# Patient Record
Sex: FEMALE | Race: WHITE | Employment: FULL TIME | URBAN - METROPOLITAN AREA
[De-identification: names, ages, dates, MRNs, and addresses within clinical notes are randomized per-mention and may not be internally consistent; named-entity substitution may affect disease eponyms.]

---

## 2020-10-23 ENCOUNTER — OFFICE VISIT (OUTPATIENT)
Dept: FAMILY MEDICINE CLINIC | Age: 40
End: 2020-10-23
Payer: COMMERCIAL

## 2020-10-23 VITALS
TEMPERATURE: 98.4 F | SYSTOLIC BLOOD PRESSURE: 137 MMHG | WEIGHT: 293 LBS | OXYGEN SATURATION: 96 % | RESPIRATION RATE: 20 BRPM | BODY MASS INDEX: 48.82 KG/M2 | HEIGHT: 65 IN | HEART RATE: 77 BPM | DIASTOLIC BLOOD PRESSURE: 77 MMHG

## 2020-10-23 DIAGNOSIS — E66.01 OBESITY, MORBID (HCC): ICD-10-CM

## 2020-10-23 DIAGNOSIS — Z91.09 ENVIRONMENTAL ALLERGIES: ICD-10-CM

## 2020-10-23 DIAGNOSIS — E53.9 VITAMIN B DEFICIENCY: ICD-10-CM

## 2020-10-23 DIAGNOSIS — Z91.89 AT RISK FOR NUTRITION DEFICIENCY: ICD-10-CM

## 2020-10-23 DIAGNOSIS — B00.1 RECURRENT COLD SORES: ICD-10-CM

## 2020-10-23 DIAGNOSIS — Z23 ENCOUNTER FOR IMMUNIZATION: ICD-10-CM

## 2020-10-23 DIAGNOSIS — M54.9 CHRONIC BACK PAIN, UNSPECIFIED BACK LOCATION, UNSPECIFIED BACK PAIN LATERALITY: ICD-10-CM

## 2020-10-23 DIAGNOSIS — L71.9 ROSACEA: ICD-10-CM

## 2020-10-23 DIAGNOSIS — E55.9 VITAMIN D DEFICIENCY: ICD-10-CM

## 2020-10-23 DIAGNOSIS — Z23 NEEDS FLU SHOT: ICD-10-CM

## 2020-10-23 DIAGNOSIS — Z98.84 S/P GASTRIC BYPASS: ICD-10-CM

## 2020-10-23 DIAGNOSIS — Z76.89 ENCOUNTER TO ESTABLISH CARE: Primary | ICD-10-CM

## 2020-10-23 DIAGNOSIS — Z00.00 WELL WOMAN EXAM (NO GYNECOLOGICAL EXAM): ICD-10-CM

## 2020-10-23 DIAGNOSIS — G89.29 CHRONIC BACK PAIN, UNSPECIFIED BACK LOCATION, UNSPECIFIED BACK PAIN LATERALITY: ICD-10-CM

## 2020-10-23 DIAGNOSIS — F40.298 FEAR OF NEEDLES: ICD-10-CM

## 2020-10-23 DIAGNOSIS — Z87.09 HISTORY OF BRONCHITIS: ICD-10-CM

## 2020-10-23 DIAGNOSIS — R03.0 PREHYPERTENSION: ICD-10-CM

## 2020-10-23 PROCEDURE — 90686 IIV4 VACC NO PRSV 0.5 ML IM: CPT

## 2020-10-23 PROCEDURE — 90715 TDAP VACCINE 7 YRS/> IM: CPT

## 2020-10-23 PROCEDURE — 99202 OFFICE O/P NEW SF 15 MIN: CPT | Performed by: FAMILY MEDICINE

## 2020-10-23 PROCEDURE — 90472 IMMUNIZATION ADMIN EACH ADD: CPT

## 2020-10-23 PROCEDURE — 90471 IMMUNIZATION ADMIN: CPT

## 2020-10-23 PROCEDURE — 99386 PREV VISIT NEW AGE 40-64: CPT | Performed by: FAMILY MEDICINE

## 2020-10-23 RX ORDER — LANOLIN ALCOHOL/MO/W.PET/CERES
CREAM (GRAM) TOPICAL
COMMUNITY

## 2020-10-23 RX ORDER — CYANOCOBALAMIN 1000 UG/ML
INJECTION, SOLUTION INTRAMUSCULAR; SUBCUTANEOUS
COMMUNITY
End: 2021-01-25 | Stop reason: DRUGHIGH

## 2020-10-23 RX ORDER — DIPHENHYDRAMINE HCL 25 MG
CAPSULE ORAL
COMMUNITY
Start: 2015-10-06

## 2020-10-23 RX ORDER — MINOCYCLINE HYDROCHLORIDE 100 MG/1
CAPSULE ORAL
COMMUNITY
Start: 2020-08-20 | End: 2022-10-13 | Stop reason: SDUPTHER

## 2020-10-23 RX ORDER — ACYCLOVIR 400 MG/1
TABLET ORAL
COMMUNITY
Start: 2020-08-20 | End: 2022-07-13 | Stop reason: SDUPTHER

## 2020-10-23 RX ORDER — METRONIDAZOLE 7.5 MG/G
GEL TOPICAL
COMMUNITY
Start: 2020-08-20 | End: 2021-01-25

## 2020-10-23 RX ORDER — NYSTATIN AND TRIAMCINOLONE ACETONIDE 100000; 1 [USP'U]/G; MG/G
OINTMENT TOPICAL
COMMUNITY

## 2020-10-23 RX ORDER — LEVOCETIRIZINE DIHYDROCHLORIDE 5 MG/1
TABLET, FILM COATED ORAL
COMMUNITY
Start: 2018-09-01

## 2020-10-23 NOTE — PROGRESS NOTES
Chief Complaint   Patient presents with   [address]     has been going to [facility] as P[facility], decided to find a provider outside of [facility]     1. Have you been to the ER, urgent care clinic since your last visit? Hospitalized since your last visit? No    2. Have you seen or consulted any other health care providers outside of the [address] since your last visit? Include any pap smears or colon screening. Yes When: August 2020 Where: Dermatologist Reason for visit: breakouts     Telehealth with GYN June 2020 to discuss birth control. Health Maintenance Due   Topic Date Due    DTaP/Tdap/Td series (1 - Tdap) 03/30/2000    PAP AKA CERVICAL CYTOLOGY  03/30/2000    Lipid Screen  03/30/2019    Flu Vaccine (1) 09/01/2020     PAP-1/9/2020  Mammogram-3/2020  TDAP-Willing to discuss      Learning Assessment 10/23/2020   PRIMARY LEARNER Patient   HIGHEST LEVEL OF EDUCATION - PRIMARY LEARNER  > 4 YEARS OF COLLEGE   PRIMARY LANGUAGE ENGLISH   LEARNER PREFERENCE PRIMARY DEMONSTRATION   ANSWERED BY Patient   RELATIONSHIP SELF       3 most recent PHQ Screens 10/23/2020   Little interest or pleasure in doing things Not at all   Feeling down, depressed, irritable, or hopeless Not at all   Total Score PHQ 2 0       Abuse Screening Questionnaire 10/23/2020   Do you ever feel afraid of your partner? N   Are you in a relationship with someone who physically or mentally threatens you? N   Is it safe for you to go home?  Bere Breen

## 2020-10-23 NOTE — PROGRESS NOTES
[facility] Note      Subjective:     Chief Complaint   Patient presents with   [address]     has been going to [facility] as [facility], decided to find a provider outside of [facility] is a 38y.o. year old female who presents for evaluation of the following:    Establishment of Care:  Previous PCP: [facility]  Patient Care Team:  Kaye Cosme MD as PCP - General (Family Medicine)   Dentist- Ramandeep guevara dentristr  [address] [place]      PMH:   Obesity s/p Gastric Bypass :   Patient perception: \"not good\"  Motivation: \"I need to\"  Pre bypass weight: unknown  Diet: unrestricted   -No food log in office  Activity: None   -Taking Iron and getting B12 shots  -- was prescribed metformin by GYN but did not take it  Treatment:  Key Obesity Meds     Patient is on no anti-obesity meds. Last Weight Metrics:  Weight Loss Metrics 10/23/2020   Today's Wt 353 lb 12.8 oz   BMI 58.88 kg/m2       Vitamin B12 Deficiency/ Vitami D Deficiney:  Thought to be s/p gastric bypass  Has required blood transfusioninthe psat  States endoiscopy don eint he past was normal.   Also low iron and vitaminD  - not compliance with vitamin D supplementation    Chronic Back Pain:   Occurring \"every once and a while\"   Manged by orthopedist  Completed physical therapy.    Chiropractor helped    Does not do daily back stretches currently  Tx: tylnrol prn    Rosacea  Tx: name unkne- has not started yet  Manged by dermatology    Allergies  Chornic since >2 years ago   otc zyzal  Reports his otof allergy testing   Allergiic to aniaml dande and other environmental    History of Bronchitis   None last year    Oral Cold Sores:   Tx: acyclovir daily  Frequency  Triggers cold sores  Nevoer tested for herpes   Multiple family member with this  manged by GYN    Acute Concerns:  None    Social:   Employment- works as an educational specialist  Lives alone    Health Maintenance:   Health Maintenance   Topic Date Due    DTaP/Tdap/Td series (1 - Tdap) 03/30/2000    PAP AKA CERVICAL CYTOLOGY  03/30/2000    Lipid Screen  03/30/2019    Flu Vaccine (1) 09/01/2020    Pneumococcal 0-64 years  Aged Out     HIV or other STD testing: Declined  Domestic Violence Screen:   Abuse Screening Questionnaire 10/23/2020   Do you ever feel afraid of your partner? N   Are you in a relationship with someone who physically or mentally threatens you? N   Is it safe for you to go home? Y     Depression Screen:   3 most recent PHQ Screens 10/23/2020   Little interest or pleasure in doing things Not at all   Feeling down, depressed, irritable, or hopeless Not at all   Total Score PHQ 2 0     Smoker? Never     G0  Pap:  Last with GYN  Mammogram: Last with GYN  Patient's last menstrual period was 10/23/2020 (exact date). Menses Monthly, lasting 5 days. No recent worsening bleeding or intermenstrual bleeding   has no history on file for sexual activity. Review of Systems   Pertinent positives and negative per HPI. All other systems  reviewed are negative for a Comprehensive ROS (10+). History reviewed. No pertinent past medical history.      Social History     Socioeconomic History    Marital status: SINGLE     Spouse name: Not on file    Number of children: Not on file    Years of education: Not on file    Highest education level: Not on file   Occupational History    Not on file   Social Needs    Financial resource strain: Not on file    Food insecurity     Worry: Not on file     Inability: Not on file    Transportation needs     Medical: Not on file     Non-medical: Not on file   Tobacco Use    Smoking status: Not on file   Substance and Sexual Activity    Alcohol use: Not on file    Drug use: Not on file    Sexual activity: Not on file   Lifestyle    Physical activity     Days per week: Not on file     Minutes per session: Not on file    Stress: Not on file   Relationships    Social connections     Talks on phone: Not on file     Gets together: Not on file     Attends Gnosticism service: Not on file     Active member of club or organization: Not on file     Attends meetings of clubs or organizations: Not on file     Relationship status: Not on file    Intimate partner violence     Fear of current or ex partner: Not on file     Emotionally abused: Not on file     Physically abused: Not on file     Forced sexual activity: Not on file   Other Topics Concern    Not on file   Social History Narrative    Not on file       History reviewed. No pertinent family history. Current Outpatient Medications   Medication Sig    cyanocobalamin (VITAMIN B12) 1,000 mcg/mL injection B-12   B-12 injections monthly    ferrous sulfate (Iron) 325 mg (65 mg iron) tablet iron    acyclovir (ZOVIRAX) 400 mg tablet TAKE 1 TABLET BY MOUTH EVERY DAY    levocetirizine (Xyzal) 5 mg tablet     diphenhydrAMINE (BenadryL) 25 mg capsule     nystatin-triamcinolone (MYCOLOG) 100,000-0.1 unit/gram-% ointment nystatin-triamcinolone 100,000 unit/gram-0.1 % topical ointment    minocycline (MINOCIN, DYNACIN) 100 mg capsule TAKE ONE CAPSULE BY MOUTH DAILY WITH FOOD    metroNIDAZOLE (METROGEL) 0.75 % topical gel APPLY GEL TO FACE DAILY     No current facility-administered medications for this visit. Objective:     Vitals:    10/23/20 1445   BP: 137/77   Pulse: 77   Resp: 20   Temp: 98.4 °F (36.9 °C)   TempSrc: Temporal   SpO2: 96%   Weight: (!) 353 lb 12.8 oz (160.5 kg)   Height: 5' 5\" (1.651 m)       Physical Examination:  General: Alert, cooperative, no distress, appears stated age. Obese  Eyes: Conjunctivae clear. Pupils equally round and reactive to light, Extraocular muscles intact. Ears: Normal external ear canals both ears. Tympanic membranes clear and mobile bilaterally   Nose: Nares normal. Septum midline.  Mucosa normal. No drainage or sinus tenderness. Mouth/Throat: Lips, mucosa, and tongue normal. No oropharyngeal erythema. No tonsillar enlargement or exudate. Neck: Supple, symmetrical, trachea midline, no adenopathy. No thyroid enlargement/tenderness/nodules  Respiratory: Breathing comfortably, in no acute respiratory distress. Clear to auscultation bilaterally. Normal inspiratory and expiratory ratio. Cardiovascular: Regular rate and rhythm, S1, S2 normal, no murmur, click, rub or gallop.   -Extremities no edema. Pulses 2+ and symmetric radial and dorsalis pedis   Abdomen: Soft, non-tender, not distended. Bowel sounds normal. No masses or organomegaly. MSK: Extremities normal appearing, atraumatic, no effusion. Gait steady and unassisted. Back symmetric, no curvature. Range of motion normal. No Costovertebral angle tenderness. Skin: Skin color, texture, turgor normal. No rashes or lesions on exposed skin. Lymph nodes: Cervical, supraclavicular nodes normal.  Neurologic: Cranial nerves II-XII intact. Strength 5/5 grossly. Sensation and reflexes normal throughout. Psychiatric: Affect anxious. Mood euthymic. Thoughts logical. Speech volume and speed normal    No visits with results within 3 Month(s) from this visit. Latest known visit with results is:   No results found for any previous visit. Assessment/ Plan:   Diagnoses and all orders for this visit:    1. Encounter to establish care    2. Well woman exam (no gynecological exam)  -     CBC WITH AUTOMATED DIFF  -     METABOLIC PANEL, COMPREHENSIVE  -     LIPID PANEL    3. Obesity, morbid (HCC)  -     TSH AND FREE T4  -     HEMOGLOBIN A1C WITH EAG    4. S/P gastric bypass  -     FERRITIN  -     IRON PROFILE  -     VITAMIN B12 & FOLATE  -     VITAMIN D, 25 HYDROXY    5. At risk for nutrition deficiency  -     FERRITIN  -     IRON PROFILE  -     VITAMIN B12 & FOLATE  -     VITAMIN D, 25 HYDROXY    6. Vitamin B deficiency  -     VITAMIN B12 & FOLATE    7.  Vitamin D deficiency  - VITAMIN D, 25 HYDROXY    8. Recurrent cold sores    9. Rosacea    10. History of bronchitis    11. Environmental allergies    12. Prehypertension    13. Needs flu shot  -     INFLUENZA VIRUS VAC QUAD,SPLIT,PRESV FREE SYRINGE IM    14. Encounter for immunization  -     TETANUS, DIPHTHERIA TOXOIDS AND ACELLULAR PERTUSSIS VACCINE (TDAP), IN INDIVIDS. >=7, IM    15. Fear of needles    16. Chronic back pain, unspecified back location, unspecified back pain laterality      For today's visit, I did the following:  · Reviewed PMH as listed in orders  · Refilled meds for chronic conditions, per orders  · Reviewed labs in detail or ordered lab  Labs to eval end organ function and etiology of chronic/acute concerns. Relevant vaccine, cancer screening and other health maintenance reviewed and updated per orders. Blood pressure is in prehypertension range. Continue current regimen. DASH Diet recommended via AVS.   Diet and lifestyle modification encouraged for weight loss and chronic disease prevention/ management. Multiple vitamin deficiencies due to gastric bypass. Continue vitamin supplementation. Consider change to oral vitamin B supplement if level normal.   Exercises and NSAIDs for musculoskeletal concern- chronic back pain. Follow up with orthopedist.  Negative depression screen, suspect anxiety has one undiagnosed. May benefit from MARIA M next visit. Fololkw up with derm for rosacea  His ory of cold sores. Follow up with GYN. Referral to specialist for evaluation. Follow up with specialists per routine. Educated patient on red flag symptoms to warrant return to clinic or emergency room visit. I have discussed the diagnosis with the patient and the intended plan as seen in the above orders. The patient has been offered or received an after-visit summary and questions were answered concerning future plans. I have discussed medication side effects and warnings with the patient as well.        Follow-up and Dispositions    · Return in about 3 months (around 1/23/2021) for Follow Up.          Signed,    Brianne Lopez MD  10/23/2020

## 2020-10-23 NOTE — PROGRESS NOTES
Nito Phillips is a 38 y.o. female  who presents for Flu and Tdap immunizations. she denies any symptoms , reactions or allergies that would exclude them from being immunized today. Risks and adverse reactions were discussed and the VIS was given to them before injections. All questions were addressed. she was observed for 10 min post injection. There were no reactions observed.     Alcira Nina LPN

## 2020-10-25 PROBLEM — Z91.89 AT RISK FOR NUTRITION DEFICIENCY: Status: ACTIVE | Noted: 2020-10-25

## 2020-10-25 PROBLEM — Z98.84 S/P GASTRIC BYPASS: Status: ACTIVE | Noted: 2020-10-25

## 2020-10-25 PROBLEM — E66.01 OBESITY, MORBID (HCC): Status: ACTIVE | Noted: 2020-10-25

## 2020-10-25 NOTE — PATIENT INSTRUCTIONS
Weight Loss Tips:  Remember this is like a part time job so your motivation and commitment is key to your success. Mindset  Weight loss like any other behavior change starts in your mind. Think hard about what your motivates you to lose weight then meditate on that. Remind yourself of your motivation  with phone alarms, scheduled meditation time, vision board, journal- just to name a few ideas. Have realistic goals. We expect with diligent healthy diet and physical activity you can lose 5% of your body weight in 3  months. Wt in lbs x 0.05 = #lbs you should lose in 3 months. Make food and activity changes with a goal of CONSISTENCY not perfection. Food  Start eating differently. Most of your weight loss and gain is from what you eat. Use small plates only  Drink 2 liters (1/2 gallon) of water every day  HALF of every meal should be fruit or vegetables  Try meal prepping on Sunday (or your day off) with new different vegetables. Consider meal prep service such as Cle[email or web address], wepremeals. com  Replace soda with diet soda or other zero sugar drinks (selter water just fine)  Consider using the [employer] jordana for calorie counting. Goal 7355-8474 calories per day    Activity  Staying physically active will help you lose more weight and can help you get over the plateau when you weight just  won't change any more with diet. Start exercise at least 5 days per week for 40 minutes. Consider [employer] jordana for home exercises. You can start with walking. I suggest walking at a speed of at least 3.5-4.5mph to for the weight loss benefit. Increase your speed or distance every 2 weeks. Do some slow stretching daily of legs, arms and back. Consider adding weight training with light weights at home or at the gym. See a doctor or a physical training for  instructions in order to avoid injuries from doing muscle training incorrectly.   Free fitness program in [place]: [employer] [email or web address]         DASH Diet: Care Instructions  Your Care Instructions     The DASH diet is an eating plan that can help lower your blood pressure. DASH stands for Dietary Approaches to Stop Hypertension. Hypertension is high blood pressure. The DASH diet focuses on eating foods that are high in calcium, potassium, and magnesium. These nutrients can lower blood pressure. The foods that are highest in these nutrients are fruits, vegetables, low-fat dairy products, nuts, seeds, and legumes. But taking calcium, potassium, and magnesium supplements instead of eating foods that are high in those nutrients does not have the same effect. The DASH diet also includes whole grains, fish, and poultry. The DASH diet is one of several lifestyle changes your doctor may recommend to lower your high blood pressure. Your doctor may also want you to decrease the amount of sodium in your diet. Lowering sodium while following the DASH diet can lower blood pressure even further than just the DASH diet alone. Follow-up care is a key part of your treatment and safety. Be sure to make and go to all appointments, and call your doctor if you are having problems. It's also a good idea to know your test results and keep a list of the medicines you take. How can you care for yourself at home? Following the DASH diet  · Eat 4 to 5 servings of fruit each day. A serving is 1 medium-sized piece of fruit, ½ cup chopped or canned fruit, 1/4 cup dried fruit, or 4 ounces (½ cup) of fruit juice. Choose fruit more often than fruit juice. · Eat 4 to 5 servings of vegetables each day. A serving is 1 cup of lettuce or raw leafy vegetables, ½ cup of chopped or cooked vegetables, or 4 ounces (½ cup) of vegetable juice. Choose vegetables more often than vegetable juice. · Get 2 to 3 servings of low-fat and fat-free dairy each day.  A serving is 8 ounces of milk, 1 cup of yogurt, or 1 ½ ounces of cheese. · Eat 6 to 8 servings of grains each day. A serving is 1 slice of bread, 1 ounce of dry cereal, or ½ cup of cooked rice, pasta, or cooked cereal. Try to choose whole-grain products as much as possible. · Limit lean meat, poultry, and fish to 2 servings each day. A serving is 3 ounces, about the size of a deck of cards. · Eat 4 to 5 servings of nuts, seeds, and legumes (cooked dried beans, lentils, and split peas) each week. A serving is 1/3 cup of nuts, 2 tablespoons of seeds, or ½ cup of cooked beans or peas. · Limit fats and oils to 2 to 3 servings each day. A serving is 1 teaspoon of vegetable oil or 2 tablespoons of salad dressing. · Limit sweets and added sugars to 5 servings or less a week. A serving is 1 tablespoon jelly or jam, ½ cup sorbet, or 1 cup of lemonade. · Eat less than 2,300 milligrams (mg) of sodium a day. If you limit your sodium to 1,500 mg a day, you can lower your blood pressure even more. Tips for success  · Start small. Do not try to make dramatic changes to your diet all at once. You might feel that you are missing out on your favorite foods and then be more likely to not follow the plan. Make small changes, and stick with them. Once those changes become habit, add a few more changes. · Try some of the following:  ? Make it a goal to eat a fruit or vegetable at every meal and at snacks. This will make it easy to get the recommended amount of fruits and vegetables each day. ? Try yogurt topped with fruit and nuts for a snack or healthy dessert. ? Add lettuce, tomato, cucumber, and onion to sandwiches. ? Combine a ready-made pizza crust with low-fat mozzarella cheese and lots of vegetable toppings. Try using tomatoes, squash, spinach, broccoli, carrots, cauliflower, and onions. ? Have a variety of cut-up vegetables with a low-fat dip as an appetizer instead of chips and dip. ? Sprinkle sunflower seeds or chopped almonds over salads.  Or try adding chopped walnuts or almonds to cooked vegetables. ? Try some vegetarian meals using beans and peas. Add garbanzo or kidney beans to salads. Make burritos and tacos with mashed leong beans or black beans. Where can you learn more? Go to http://www.johansen.com/  Enter H967 in the search box to learn more about \"DASH Diet: Care Instructions. \"  Current as of: December 16, 2019               Content Version: 12.6  © 6495-8558 [employer]. Care instructions adapted under license by [employer] (which disclaims liability or warranty for this information). If you have questions about a medical condition or this instruction, always ask your healthcare professional. [address] any warranty or liability for your use of this information. Learning About Eating More Fruits and Vegetables  What are some quick tips for eating more fruits and vegetables? We're all encouraged to eat more fruits and vegetables. Yet it can seem like one more chore on the daily to-do list. But you can add color and crunch to your meals--and lots of nutrition--with these quick tips. · Brighten up your breakfast.  ? Add sliced fruit or frozen berries to your yogurt, pancakes, or cereal.  ? Blend fresh or frozen fruit, veggies, and yogurt with a little fruit juice, and you've got a tasty smoothie. ? Make your scrambled eggs a gourmet treat by adding onions, celery, and bell peppers. ? Bake up some bran muffins with grated carrots added into the mix. · Make a livelier lunch. ? Jazz up tuna or chicken salad with apple chunks, celery, or grapes--or all of them! ? Add cucumbers, avocado slices, tomatoes, and lettuce to your sandwiches. ? Kick up the flavor of grilled cheese sandwiches with spinach and tomatoes. ? Puree some potatoes or squash to add to tomato soup. · Add delicious veggies to dinner. ? Give more color and taste to salads.  Stir in red cabbage, carrots, and bell peppers. Top salads with dried cranberries or raisins. \"Frost\" your salad with orange sections or strawberries. ? Keep a bag or two of frozen vegetables ready to pull out of the freezer for a side dish. ? Spice up spaghetti and meatballs with mushrooms and bell peppers. ? Roast vegetables like cauliflower or squash in the oven with olive oil to bring out their flavor. ? Season your veggie dish with herbs like basil and rai and a splash of lemon juice and olive oil. ? If you've got a main dish in the oven, stick in a potato to round out your meal.  · Grab some healthy snacks on the go. ? Scoop up an apple, banana, or plum for a quick snack. ? Cut up raw fruits and veggies to keep in your fridge. Grapes, oranges, carrots, and celery are great choices. They'll be ready for a quick snack or an after-school treat. ? Dip raw vegetables in hummus or peanut butter. ? Keep dried fruit on hand for an easy \"take with you\" snack. · Make something sweet--and healthy. ? Try baked apples or pears topped with cinnamon and honey for a delicious dessert. ? Make chocolate chip cookies even better with grated carrots added to the mix. Where can you learn more? Go to http://www.gray.com/  Enter F050 in the search box to learn more about \"Learning About Eating More Fruits and Vegetables. \"  Current as of: August 22, 2019               Content Version: 12.6  © 3408-5281 [employer]. Care instructions adapted under license by [employer] (which disclaims liability or warranty for this information). If you have questions about a medical condition or this instruction, always ask your healthcare professional. [place] [address] any warranty or liability for your use of this information. Low Back Pain: Exercises  Introduction  Here are some examples of exercises for you to try. The exercises may be suggested for a condition or for rehabilitation.  Start each exercise slowly. Ease off the exercises if you start to have pain. You will be told when to start these exercises and which ones will work best for you. How to do the exercises  Press-up   1. Lie on your stomach, supporting your body with your forearms. 2. Press your elbows down into the floor to raise your upper back. As you do this, relax your stomach muscles and allow your back to arch without using your back muscles. As your press up, do not let your hips or pelvis come off the floor. 3. Hold for 15 to 30 seconds, then relax. 4. Repeat 2 to 4 times. Alternate arm and leg (bird dog) exercise   Do this exercise slowly. Try to keep your body straight at all times, and do not let one hip drop lower than the other. 1. Start on the floor, on your hands and knees. 2. Tighten your belly muscles. 3. Raise one leg off the floor, and hold it straight out behind you. Be careful not to let your hip drop down, because that will twist your trunk. 4. Hold for about 6 seconds, then lower your leg and switch to the other leg. 5. Repeat 8 to 12 times on each leg. 6. Over time, work up to holding for 10 to 30 seconds each time. 7. If you feel stable and secure with your leg raised, try raising the opposite arm straight out in front of you at the same time. Knee-to-chest exercise   1. Lie on your back with your knees bent and your feet flat on the floor. 2. Bring one knee to your chest, keeping the other foot flat on the floor (or keeping the other leg straight, whichever feels better on your lower back). 3. Keep your lower back pressed to the floor. Hold for at least 15 to 30 seconds. 4. Relax, and lower the knee to the starting position. 5. Repeat with the other leg. Repeat 2 to 4 times with each leg. 6. To get more stretch, put your other leg flat on the floor while pulling your knee to your chest.    Curl-ups   1. Lie on the floor on your back with your knees bent at a 90-degree angle.  Your feet should be flat on the floor, about 12 inches from your buttocks. 2. Cross your arms over your chest. If this bothers your neck, try putting your hands behind your neck (not your head), with your elbows spread apart. 3. Slowly tighten your belly muscles and raise your shoulder blades off the floor. 4. Keep your head in line with your body, and do not press your chin to your chest.  5. Hold this position for 1 or 2 seconds, then slowly lower yourself back down to the floor. 6. Repeat 8 to 12 times. Pelvic tilt exercise   1. Lie on your back with your knees bent. 2. \"Brace\" your stomach. This means to tighten your muscles by pulling in and imagining your belly button moving toward your spine. You should feel like your back is pressing to the floor and your hips and pelvis are rocking back. 3. Hold for about 6 seconds while you breathe smoothly. 4. Repeat 8 to 12 times. Heel dig bridging   1. Lie on your back with both knees bent and your ankles bent so that only your heels are digging into the floor. Your knees should be bent about 90 degrees. 2. Then push your heels into the floor, squeeze your buttocks, and lift your hips off the floor until your shoulders, hips, and knees are all in a straight line. 3. Hold for about 6 seconds as you continue to breathe normally, and then slowly lower your hips back down to the floor and rest for up to 10 seconds. 4. Do 8 to 12 repetitions. Hamstring stretch in doorway   1. Lie on your back in a doorway, with one leg through the open door. 2. Slide your leg up the wall to straighten your knee. You should feel a gentle stretch down the back of your leg. 3. Hold the stretch for at least 15 to 30 seconds. Do not arch your back, point your toes, or bend either knee. Keep one heel touching the floor and the other heel touching the wall. 4. Repeat with your other leg. 5. Do 2 to 4 times for each leg. Hip flexor stretch   1.  Kneel on the floor with one knee bent and one leg behind you. Place your forward knee over your foot. Keep your other knee touching the floor. 2. Slowly push your hips forward until you feel a stretch in the upper thigh of your rear leg. 3. Hold the stretch for at least 15 to 30 seconds. Repeat with your other leg. 4. Do 2 to 4 times on each side. Wall sit   1. Stand with your back 10 to 12 inches away from a wall. 2. Lean into the wall until your back is flat against it. 3. Slowly slide down until your knees are slightly bent, pressing your lower back into the wall. 4. Hold for about 6 seconds, then slide back up the wall. 5. Repeat 8 to 12 times. Follow-up care is a key part of your treatment and safety. Be sure to make and go to all appointments, and call your doctor if you are having problems. It's also a good idea to know your test results and keep a list of the medicines you take. Where can you learn more? Go to http://www.gray.com/  Enter Q861 in the search box to learn more about \"Low Back Pain: Exercises. \"  Current as of: March 2, 2020               Content Version: 12.6  © 1979-2639 [employer]. Care instructions adapted under license by [employer] (which disclaims liability or warranty for this information). If you have questions about a medical condition or this instruction, always ask your healthcare professional. [place] [address] any warranty or liability for your use of this information. A Healthy Lifestyle: Care Instructions  Your Care Instructions     A healthy lifestyle can help you feel good, stay at a healthy weight, and have plenty of energy for both work and play. A healthy lifestyle is something you can share with your whole family. A healthy lifestyle also can lower your risk for serious health problems, such as high blood pressure, heart disease, and diabetes.   You can follow a few steps listed below to improve your health and the health of your family. Follow-up care is a key part of your treatment and safety. Be sure to make and go to all appointments, and call your doctor if you are having problems. It's also a good idea to know your test results and keep a list of the medicines you take. How can you care for yourself at home? · Do not eat too much sugar, fat, or fast foods. You can still have dessert and treats now and then. The goal is moderation. · Start small to improve your eating habits. Pay attention to portion sizes, drink less juice and soda pop, and eat more fruits and vegetables. ? Eat a healthy amount of food. A 3-ounce serving of meat, for example, is about the size of a deck of cards. Fill the rest of your plate with vegetables and whole grains. ? Limit the amount of soda and sports drinks you have every day. Drink more water when you are thirsty. ? Eat at least 5 servings of fruits and vegetables every day. It may seem like a lot, but it is not hard to reach this goal. A serving or helping is 1 piece of fruit, 1 cup of vegetables, or 2 cups of leafy, raw vegetables. Have an apple or some carrot sticks as an afternoon snack instead of a candy bar. Try to have fruits and/or vegetables at every meal.  · Make exercise part of your daily routine. You may want to start with simple activities, such as walking, bicycling, or slow swimming. Try to be active 30 to 60 minutes every day. You do not need to do all 30 to 60 minutes all at once. For example, you can exercise 3 times a day for 10 or 20 minutes. Moderate exercise is safe for most people, but it is always a good idea to talk to your doctor before starting an exercise program.  · Keep moving. Becky Humphries the lawn, work in the garden, or [employer]. Take the stairs instead of the elevator at work. · If you smoke, quit. People who smoke have an increased risk for heart attack, stroke, cancer, and other lung illnesses.  Quitting is hard, but there are ways to boost your chance of quitting tobacco for good. ? Use nicotine gum, patches, or lozenges. ? Ask your doctor about stop-smoking programs and medicines. ? Keep trying. In addition to reducing your risk of diseases in the future, you will notice some benefits soon after you stop using tobacco. If you have shortness of breath or asthma symptoms, they will likely get better within a few weeks after you quit. · Limit how much alcohol you drink. Moderate amounts of alcohol (up to 2 drinks a day for men, 1 drink a day for women) are okay. But drinking too much can lead to liver problems, high blood pressure, and other health problems. Family health  If you have a family, there are many things you can do together to improve your health. · Eat meals together as a family as often as possible. · Eat healthy foods. This includes fruits, vegetables, lean meats and dairy, and whole grains. · Include your family in your fitness plan. Most people think of activities such as jogging or tennis as the way to fitness, but there are many ways you and your family can be more active. Anything that makes you breathe hard and gets your heart pumping is exercise. Here are some tips:  ? Walk to do errands or to take your child to school or the bus.  ? Go for a family bike ride after dinner instead of watching TV. Where can you learn more? Go to http://www.gray.com/  Enter R610 in the search box to learn more about \"A Healthy Lifestyle: Care Instructions. \"  Current as of: January 31, 2020               Content Version: 12.6  © 2006-2020 [employer]. Care instructions adapted under license by [employer] (which disclaims liability or warranty for this information). If you have questions about a medical condition or this instruction, always ask your healthcare professional. [address] any warranty or liability for your use of this information.

## 2020-11-06 ENCOUNTER — APPOINTMENT (OUTPATIENT)
Dept: FAMILY MEDICINE CLINIC | Age: 40
End: 2020-11-06

## 2020-11-07 LAB
25(OH)D3+25(OH)D2 SERPL-MCNC: 12.4 NG/ML (ref 30–100)
ALBUMIN SERPL-MCNC: 4.2 G/DL (ref 3.8–4.8)
ALBUMIN/GLOB SERPL: 1.5 {RATIO} (ref 1.2–2.2)
ALP SERPL-CCNC: 140 IU/L (ref 39–117)
ALT SERPL-CCNC: 17 IU/L (ref 0–32)
AST SERPL-CCNC: 16 IU/L (ref 0–40)
BASOPHILS # BLD AUTO: 0 X10E3/UL (ref 0–0.2)
BASOPHILS NFR BLD AUTO: 0 %
BILIRUB SERPL-MCNC: 0.4 MG/DL (ref 0–1.2)
BUN SERPL-MCNC: 7 MG/DL (ref 6–24)
BUN/CREAT SERPL: 13 (ref 9–23)
CALCIUM SERPL-MCNC: 8.9 MG/DL (ref 8.7–10.2)
CHLORIDE SERPL-SCNC: 100 MMOL/L (ref 96–106)
CHOLEST SERPL-MCNC: 170 MG/DL (ref 100–199)
CO2 SERPL-SCNC: 24 MMOL/L (ref 20–29)
CREAT SERPL-MCNC: 0.56 MG/DL (ref 0.57–1)
EOSINOPHIL # BLD AUTO: 0.1 X10E3/UL (ref 0–0.4)
EOSINOPHIL NFR BLD AUTO: 1 %
ERYTHROCYTE [DISTWIDTH] IN BLOOD BY AUTOMATED COUNT: 13 % (ref 11.7–15.4)
EST. AVERAGE GLUCOSE BLD GHB EST-MCNC: 108 MG/DL
FERRITIN SERPL-MCNC: 79 NG/ML (ref 15–150)
FOLATE SERPL-MCNC: 9.6 NG/ML
GLOBULIN SER CALC-MCNC: 2.8 G/DL (ref 1.5–4.5)
GLUCOSE SERPL-MCNC: 90 MG/DL (ref 65–99)
HBA1C MFR BLD: 5.4 % (ref 4.8–5.6)
HCT VFR BLD AUTO: 44.6 % (ref 34–46.6)
HDLC SERPL-MCNC: 49 MG/DL
HGB BLD-MCNC: 15.1 G/DL (ref 11.1–15.9)
IMM GRANULOCYTES # BLD AUTO: 0 X10E3/UL (ref 0–0.1)
IMM GRANULOCYTES NFR BLD AUTO: 0 %
INTERPRETATION, 910389: NORMAL
IRON SATN MFR SERPL: 21 % (ref 15–55)
IRON SERPL-MCNC: 69 UG/DL (ref 27–159)
LDLC SERPL CALC-MCNC: 99 MG/DL (ref 0–99)
LYMPHOCYTES # BLD AUTO: 2.7 X10E3/UL (ref 0.7–3.1)
LYMPHOCYTES NFR BLD AUTO: 27 %
MCH RBC QN AUTO: 29.6 PG (ref 26.6–33)
MCHC RBC AUTO-ENTMCNC: 33.9 G/DL (ref 31.5–35.7)
MCV RBC AUTO: 88 FL (ref 79–97)
MONOCYTES # BLD AUTO: 0.6 X10E3/UL (ref 0.1–0.9)
MONOCYTES NFR BLD AUTO: 6 %
NEUTROPHILS # BLD AUTO: 6.6 X10E3/UL (ref 1.4–7)
NEUTROPHILS NFR BLD AUTO: 66 %
PLATELET # BLD AUTO: 195 X10E3/UL (ref 150–450)
POTASSIUM SERPL-SCNC: 4.4 MMOL/L (ref 3.5–5.2)
PROT SERPL-MCNC: 7 G/DL (ref 6–8.5)
RBC # BLD AUTO: 5.1 X10E6/UL (ref 3.77–5.28)
SODIUM SERPL-SCNC: 138 MMOL/L (ref 134–144)
T4 FREE SERPL-MCNC: 1.05 NG/DL (ref 0.82–1.77)
TIBC SERPL-MCNC: 333 UG/DL (ref 250–450)
TRIGL SERPL-MCNC: 122 MG/DL (ref 0–149)
TSH SERPL DL<=0.005 MIU/L-ACNC: 2.56 UIU/ML (ref 0.45–4.5)
UIBC SERPL-MCNC: 264 UG/DL (ref 131–425)
VIT B12 SERPL-MCNC: 277 PG/ML (ref 232–1245)
VLDLC SERPL CALC-MCNC: 22 MG/DL (ref 5–40)
WBC # BLD AUTO: 10.2 X10E3/UL (ref 3.4–10.8)

## 2020-11-10 RX ORDER — ASPIRIN 325 MG
50000 TABLET, DELAYED RELEASE (ENTERIC COATED) ORAL
Qty: 12 CAP | Refills: 1 | Status: SHIPPED | OUTPATIENT
Start: 2020-11-10 | End: 2021-05-10

## 2020-11-10 NOTE — PROGRESS NOTES
Most of your results are normal.  Your vitamin D level is lower than normal.  I recommend starting a vitamin D supplement to improve this. I will send a supplement to your pharmacy.  Mychart result comment sent

## 2021-01-25 ENCOUNTER — OFFICE VISIT (OUTPATIENT)
Dept: FAMILY MEDICINE CLINIC | Age: 41
End: 2021-01-25
Payer: COMMERCIAL

## 2021-01-25 VITALS
RESPIRATION RATE: 16 BRPM | HEIGHT: 65 IN | SYSTOLIC BLOOD PRESSURE: 120 MMHG | OXYGEN SATURATION: 97 % | DIASTOLIC BLOOD PRESSURE: 72 MMHG | HEART RATE: 78 BPM | TEMPERATURE: 98 F | WEIGHT: 293 LBS | BODY MASS INDEX: 48.82 KG/M2

## 2021-01-25 DIAGNOSIS — Z98.84 S/P GASTRIC BYPASS: ICD-10-CM

## 2021-01-25 DIAGNOSIS — E55.9 VITAMIN D DEFICIENCY: Primary | ICD-10-CM

## 2021-01-25 DIAGNOSIS — E53.9 VITAMIN B DEFICIENCY: ICD-10-CM

## 2021-01-25 DIAGNOSIS — E66.01 OBESITY, MORBID (HCC): ICD-10-CM

## 2021-01-25 DIAGNOSIS — L85.3 DRY SKIN: ICD-10-CM

## 2021-01-25 DIAGNOSIS — Z91.89 AT RISK FOR NUTRITION DEFICIENCY: ICD-10-CM

## 2021-01-25 PROCEDURE — 99214 OFFICE O/P EST MOD 30 MIN: CPT | Performed by: FAMILY MEDICINE

## 2021-01-25 RX ORDER — NORGESTIMATE AND ETHINYL ESTRADIOL 0.25-0.035
KIT ORAL
COMMUNITY
End: 2021-01-25

## 2021-01-25 RX ORDER — LANOLIN ALCOHOL/MO/W.PET/CERES
2000 CREAM (GRAM) TOPICAL DAILY
Qty: 180 TAB | Refills: 3 | Status: SHIPPED | OUTPATIENT
Start: 2021-01-25 | End: 2022-10-13 | Stop reason: SDUPTHER

## 2021-01-25 RX ORDER — ERGOCALCIFEROL 1.25 MG/1
CAPSULE ORAL
COMMUNITY
End: 2021-10-27 | Stop reason: SDUPTHER

## 2021-01-25 RX ORDER — SELENIUM SULFIDE 2.5 MG/100ML
LOTION TOPICAL
COMMUNITY
Start: 2020-12-10 | End: 2022-10-13 | Stop reason: SDUPTHER

## 2021-01-25 RX ORDER — AZITHROMYCIN 250 MG/1
TABLET, FILM COATED ORAL
COMMUNITY
End: 2021-01-25

## 2021-01-25 RX ORDER — MISOPROSTOL 200 UG/1
TABLET ORAL
COMMUNITY
End: 2021-01-25

## 2021-01-25 NOTE — PROGRESS NOTES
[facility] Note      Subjective:     Chief Complaint   Patient presents with    Results     follow up     Jethro Holden is a 38y.o. year old female who presents for evaluation of the following:      PMH:   Obesity s/p Gastric Bypass :   Patient perception: \"not good\"  Motivation: \"I need to\"  Pre bypass weight: unknown  Diet: unrestricted  - started meal planning 1/2021  -No food log in office  Activity: None   -Taking Iron and getting B12 shots  -- was prescribed metformin by GYN but did not take it  Treatment:  Key Obesity Meds     Patient is on no anti-obesity meds. Last Weight Metrics:  Weight Loss Metrics 1/25/2021 10/23/2020   Today's Wt 352 lb 353 lb 12.8 oz   BMI 58.58 kg/m2 58.88 kg/m2       Vitamin B12 Deficiency/ Vitami D Deficiney:  Thought to be s/p gastric bypass  Tx: B12 injections administered by her mother due to patient fear of needles, vitamin D 50,000iu weekly  Has required blood transfusion in the past  States endoiscopy done int he past was normal.   Also low iron and vitamin D  - not compliance with vitamin D supplementation  Lab Results   Component Value Date/Time    VITAMIN D, 25-HYDROXY 12.4 (L) 11/06/2020 12:00 AM         Social:   Employment- works as an   Lives alone    Patient Care Team:  Dominique Garcia MD as PCP - General (Family Medicine)  Dominique Garcia MD as PCP - [facility] Empaneled Provider   Dentist- Maryann guevara [facility]  [address] [place]    Review of Systems   Pertinent positives and negative per HPI. All other systems  reviewed are negative for a Comprehensive ROS (10+).        Past Medical History:   Diagnosis Date    History of blood transfusion         Social History     Socioeconomic History    Marital status: SINGLE     Spouse name: Not on file    Number of children: Not on file    Years of education: Not on file    Highest education level: Not on file   Occupational History    Not on file   Social Needs    Financial resource strain: Not on file    Food insecurity     Worry: Not on file     Inability: Not on file    Transportation needs     Medical: Not on file     Non-medical: Not on file   Tobacco Use    Smoking status: Never Smoker    Smokeless tobacco: Never Used   Substance and Sexual Activity    Alcohol use: Yes     Comment: occasional    Drug use: Never    Sexual activity: Not Currently   Lifestyle    Physical activity     Days per week: Not on file     Minutes per session: Not on file    Stress: Not on file   Relationships    Social connections     Talks on phone: Not on file     Gets together: Not on file     Attends Yazidism service: Not on file     Active member of club or organization: Not on file     Attends meetings of clubs or organizations: Not on file     Relationship status: Not on file    Intimate partner violence     Fear of current or ex partner: Not on file     Emotionally abused: Not on file     Physically abused: Not on file     Forced sexual activity: Not on file   Other Topics Concern    Not on file   Social History Narrative    Not on file       Family History   Problem Relation Age of Onset    Elevated Lipids Mother     Hypertension Mother     Thyroid Disease Mother     Diabetes Father     Hypertension Father    Jani Spina Elevated Lipids Father     Sleep Apnea Father     MS Maternal Aunt     Hypertension Paternal Aunt     Elevated Lipids Paternal Aunt     Lung Cancer Maternal Grandfather     Diabetes Maternal Grandfather     Heart Disease Maternal Grandfather     Elevated Lipids Maternal Grandfather     Heart Disease Paternal Grandmother     Hypertension Paternal Grandmother     Elevated Lipids Paternal Grandmother     Elevated Lipids Paternal Grandfather     Hypertension Paternal Grandfather        Current Outpatient Medications   Medication Sig    selenium sulfide 2.5 % lotion     ergocalciferol (ERGOCALCIFEROL) 1,250 mcg (50,000 unit) capsule ergocalciferol (vitamin D2) 1,250 mcg (50,000 unit) capsule    cholecalciferol (VITAMIN D3) (50,000 UNITS /1250 MCG) capsule Take 1 Cap by mouth every seven (7) days. if available OTC, please notify pt    cyanocobalamin (VITAMIN B12) 1,000 mcg/mL injection B-12   B-12 injections monthly    ferrous sulfate (Iron) 325 mg (65 mg iron) tablet iron    acyclovir (ZOVIRAX) 400 mg tablet TAKE 1 TABLET BY MOUTH EVERY DAY    levocetirizine (Xyzal) 5 mg tablet     diphenhydrAMINE (BenadryL) 25 mg capsule     minocycline (MINOCIN, DYNACIN) 100 mg capsule TAKE ONE CAPSULE BY MOUTH DAILY WITH FOOD    nystatin-triamcinolone (MYCOLOG) 100,000-0.1 unit/gram-% ointment nystatin-triamcinolone 100,000 unit/gram-0.1 % topical ointment    miSOPROStoL (Cytotec) 200 mcg tablet Cytotec 200 mcg tablet   Take 1 tablet(s) by mouth 8 hours prior to procedure.  azithromycin (ZITHROMAX) 250 mg tablet azithromycin 250 mg tablet    norgestimate-ethinyl estradioL (Sprintec, 28,) 0.25-35 mg-mcg tab Sprintec (28) 0.25 mg-35 mcg tablet    metroNIDAZOLE (METROGEL) 0.75 % topical gel APPLY GEL TO FACE DAILY     No current facility-administered medications for this visit. Objective:     Vitals:    01/25/21 0805   BP: 120/72   Pulse: 78   Resp: 16   Temp: 98 °F (36.7 °C)   TempSrc: Temporal   SpO2: 97%   Weight: (!) 352 lb (159.7 kg)   Height: 5' 5\" (1.651 m)       Physical Examination:  General: Alert, cooperative, no distress, appears stated age. Obese  Eyes: Conjunctivae clear. Pupils equally round and reactive to light, Extraocular muscles intact. Ears: Normal external ear canals both ears. Nose: Nares normal. Septum midline. Mucosa normal. No drainage or sinus tenderness. Mouth/Throat: Lips, mucosa, and tongue normal. No oropharyngeal erythema. No tonsillar enlargement or exudate. Neck: Supple, symmetrical, trachea midline, no adenopathy.  No thyroid enlargement/tenderness/nodules  Respiratory: Breathing comfortably, in no acute respiratory distress. Clear to auscultation bilaterally. Normal inspiratory and expiratory ratio. Cardiovascular: Regular rate and rhythm, S1, S2 normal, no murmur, click, rub or gallop.   -Extremities no edema. Pulses 2+ and symmetric radial and dorsalis pedis   Abdomen: Soft, non-tender, not distended. MSK: Extremities normal appearing, atraumatic, no effusion. Gait steady and unassisted. Skin: Skin color, texture, normal. No rashes or lesions on exposed skin. Lymph nodes: Cervical, supraclavicular nodes normal.  Neurologic: Cranial nerves II-XII intact. Strength 5/5 grossly. Sensation and reflexes normal throughout. Psychiatric: Affect anxious. Mood euthymic. Thoughts logical. Speech volume and speed normal    No visits with results within 3 Month(s) from this visit. Latest known visit with results is:   Office Visit on 10/23/2020   Component Date Value Ref Range Status    WBC 11/06/2020 10.2  3.4 - 10.8 x10E3/uL Final    RBC 11/06/2020 5.10  3.77 - 5.28 x10E6/uL Final    HGB 11/06/2020 15.1  11.1 - 15.9 g/dL Final    HCT 11/06/2020 44.6  34.0 - 46.6 % Final    MCV 11/06/2020 88  79 - 97 fL Final    MCH 11/06/2020 29.6  26.6 - 33.0 pg Final    MCHC 11/06/2020 33.9  31.5 - 35.7 g/dL Final    RDW 11/06/2020 13.0  11.7 - 15.4 % Final    PLATELET 26/76/0601 729  150 - 450 x10E3/uL Final    NEUTROPHILS 11/06/2020 66  Not Estab. % Final    Lymphocytes 11/06/2020 27  Not Estab. % Final    MONOCYTES 11/06/2020 6  Not Estab. % Final    EOSINOPHILS 11/06/2020 1  Not Estab. % Final    BASOPHILS 11/06/2020 0  Not Estab. % Final    ABS. NEUTROPHILS 11/06/2020 6.6  1.4 - 7.0 x10E3/uL Final    Abs Lymphocytes 11/06/2020 2.7  0.7 - 3.1 x10E3/uL Final    ABS. MONOCYTES 11/06/2020 0.6  0.1 - 0.9 x10E3/uL Final    ABS. EOSINOPHILS 11/06/2020 0.1  0.0 - 0.4 x10E3/uL Final    ABS.  BASOPHILS 11/06/2020 0.0  0.0 - 0.2 x10E3/uL Final    IMMATURE GRANULOCYTES 11/06/2020 0  Not Estab. % Final    ABS. IMM. GRANS. 11/06/2020 0.0  0.0 - 0.1 x10E3/uL Final    Glucose 11/06/2020 90  65 - 99 mg/dL Final    BUN 11/06/2020 7  6 - 24 mg/dL Final    Creatinine 11/06/2020 0.56* 0.57 - 1.00 mg/dL Final    GFR est non-AA 11/06/2020 116  >59 mL/min/1.73 Final    GFR est AA 11/06/2020 134  >59 mL/min/1.73 Final    BUN/Creatinine ratio 11/06/2020 13  9 - 23 Final    Sodium 11/06/2020 138  134 - 144 mmol/L Final    Potassium 11/06/2020 4.4  3.5 - 5.2 mmol/L Final    Chloride 11/06/2020 100  96 - 106 mmol/L Final    CO2 11/06/2020 24  20 - 29 mmol/L Final    Calcium 11/06/2020 8.9  8.7 - 10.2 mg/dL Final    Protein, total 11/06/2020 7.0  6.0 - 8.5 g/dL Final    Albumin 11/06/2020 4.2  3.8 - 4.8 g/dL Final    GLOBULIN, TOTAL 11/06/2020 2.8  1.5 - 4.5 g/dL Final    A-G Ratio 11/06/2020 1.5  1.2 - 2.2 Final    Bilirubin, total 11/06/2020 0.4  0.0 - 1.2 mg/dL Final    Alk. phosphatase 11/06/2020 140* 39 - 117 IU/L Final    AST (SGOT) 11/06/2020 16  0 - 40 IU/L Final    ALT (SGPT) 11/06/2020 17  0 - 32 IU/L Final    Cholesterol, total 11/06/2020 170  100 - 199 mg/dL Final    Triglyceride 11/06/2020 122  0 - 149 mg/dL Final    HDL Cholesterol 11/06/2020 49  >39 mg/dL Final    VLDL, calculated 11/06/2020 22  5 - 40 mg/dL Final    LDL, calculated 11/06/2020 99  0 - 99 mg/dL Final    TSH 11/06/2020 2.560  0.450 - 4.500 uIU/mL Final    T4, Free 11/06/2020 1.05  0.82 - 1.77 ng/dL Final    Ferritin 11/06/2020 79  15 - 150 ng/mL Final    TIBC 11/06/2020 333  250 - 450 ug/dL Final    UIBC 11/06/2020 264  131 - 425 ug/dL Final    Iron 11/06/2020 69  27 - 159 ug/dL Final    Iron % saturation 11/06/2020 21  15 - 55 % Final    Vitamin B12 11/06/2020 277  232 - 1,245 pg/mL Final    Folate 11/06/2020 9.6  >3.0 ng/mL Final    Comment: A serum folate concentration of less than 3.1 ng/mL is  considered to represent clinical deficiency.       VITAMIN D, 25-HYDROXY 11/06/2020 12.4* 30.0 - 100.0 ng/mL Final    Comment: Vitamin D deficiency has been defined by the [address] practice guideline as a  level of serum 25-OH vitamin D less than 20 ng/mL (1,2). The Endocrine Society went on to further define vitamin D  insufficiency as a level between 21 and 29 ng/mL (2). 1. IOM ([place] of [employer]). 2010. Dietary reference     intakes for calcium and D. [facility]: The     [employer]. 2. Ghassan MF, Louann WATERS, Etta NEVAREZ, et al.     Evaluation, treatment, and prevention of vitamin D     deficiency: an Endocrine Society clinical practice     guideline. JCEM. 2011 Jul; 96(7):1911-30.  Hemoglobin A1c 11/06/2020 5.4  4.8 - 5.6 % Final    Comment:          Prediabetes: 5.7 - 6.4           Diabetes: >6.4           Glycemic control for adults with diabetes: <7.0      Estimated average glucose 11/06/2020 108  mg/dL Final    INTERPRETATION 11/06/2020 Note   Final    Supplemental report is available. Assessment/ Plan:   Diagnoses and all orders for this visit:    1. Vitamin D deficiency  -     VITAMIN D, 25 HYDROXY; Future    2. Vitamin B deficiency  -     VITAMIN B12; Future  -     cyanocobalamin (Vitamin B-12) 1,000 mcg tablet; Take 2 Tabs by mouth daily. Take 2 tabs daily for 2 weeks then take 1 tab daily    3. At risk for nutrition deficiency  -     VITAMIN B12; Future  -     cyanocobalamin (Vitamin B-12) 1,000 mcg tablet; Take 2 Tabs by mouth daily. Take 2 tabs daily for 2 weeks then take 1 tab daily    4. Obesity, morbid ([address]    5. S/P gastric bypass    6. Dry skin      For today's visit, I did the following:  · Reviewed PMH as listed in orders  · Refilled meds for chronic conditions, per orders  · Reviewed labs in detail or ordered lab  Labs to eval end organ function and etiology of chronic/acute concerns.    Diet and lifestyle modification encouraged for weight loss and chronic disease prevention/ management. Continue meal planning. Multiple vitamin deficiencies due to gastric bypass. Continue vitamin supplementation. Change to oral vitamin B supplement given aversion to needles. Will recheck at lab visit in 3 months. Trial thick emollient for dry skin. Follow up with specialists per routine. Educated patient on red flag symptoms to warrant return to clinic or emergency room visit. I have discussed the diagnosis with the patient and the intended plan as seen in the above orders. The patient has been offered or received an after-visit summary and questions were answered concerning future plans. I have discussed medication side effects and warnings with the patient as well. Follow-up and Dispositions    · Return in about 3 months (around 4/25/2021) for Follow Up lab visit, 6 months with doctor.          Signed,    Andi Fowler MD  1/25/2021

## 2021-01-25 NOTE — PATIENT INSTRUCTIONS
Weight Loss Tips:  Remember this is like a part time job so your motivation and commitment is key to your success. Mindset  Weight loss like any other behavior change starts in your mind. Think hard about what your motivates you to lose weight then meditate on that. Remind yourself of your motivation  with phone alarms, scheduled meditation time, vision board, journal- just to name a few ideas. Have realistic goals. We expect with diligent healthy diet and physical activity you can lose 5% of your body weight in 3  months. Wt in lbs x 0.05 = #lbs you should lose in 3 months. Make food and activity changes with a goal of CONSISTENCY not perfection. Food  Start eating differently. Most of your weight loss and gain is from what you eat. Use small plates only  Drink 2 liters (1/2 gallon) of water every day  HALF of every meal should be fruit or vegetables  Try meal prepping on Sunday (or your day off) with new different vegetables. Consider meal prep service such as Cle[employer], wepremeals. com  Replace soda with diet soda or other zero sugar drinks (selter water just fine)  Consider using the [employer] j[employer]ana for calorie counting. Goal 5852-0843 calories per day    Activity  Staying physically active will help you lose more weight and can help you get over the plateau when you weight just  won't change any more with diet. Start exercise at least 5 days per week for 40 minutes. Consider [employer]ana for home exercises. You can start with walking. I suggest walking at a speed of at least 3.5-4.5mph to for the weight loss benefit. Increase your speed or distance every 2 weeks. Do some slow stretching daily of legs, arms and back. Consider adding weight training with light weights at home or at the gym. See a doctor or a physical training for  instructions in order to avoid injuries from doing muscle training incorrectly.   Free fitness program in [place]: [employer] org/program/fitness-warriors/         Learning About Eating More Fruits and Vegetables  What are some quick tips for eating more fruits and vegetables? We're all encouraged to eat more fruits and vegetables. Yet it can seem like one more chore on the daily to-do list. But you can add color and crunch to your meals--and lots of nutrition--with these quick tips. · Brighten up your breakfast.  ? Add sliced fruit or frozen berries to your yogurt, pancakes, or cereal.  ? Blend fresh or frozen fruit, veggies, and yogurt with a little fruit juice, and you've got a tasty smoothie. ? Make your scrambled eggs a gourmet treat by adding onions, celery, and bell peppers. ? Bake up some bran muffins with grated carrots added into the mix. · Make a livelier lunch. ? Jazz up tuna or chicken salad with apple chunks, celery, or grapes--or all of them! ? Add cucumbers, avocado slices, tomatoes, and lettuce to your sandwiches. ? Kick up the flavor of grilled cheese sandwiches with spinach and tomatoes. ? Puree some potatoes or squash to add to tomato soup. · Add delicious veggies to dinner. ? Give more color and taste to salads. Stir in red cabbage, carrots, and bell peppers. Top salads with dried cranberries or raisins. \"Frost\" your salad with orange sections or strawberries. ? Keep a bag or two of frozen vegetables ready to pull out of the freezer for a side dish. ? Spice up spaghetti and meatballs with mushrooms and bell peppers. ? Roast vegetables like cauliflower or squash in the oven with olive oil to bring out their flavor. ? Season your veggie dish with herbs like basil and rai and a splash of lemon juice and olive oil. ? If you've got a main dish in the oven, stick in a potato to round out your meal.  · Grab some healthy snacks on the go. ? Scoop up an apple, banana, or plum for a quick snack. ? Cut up raw fruits and veggies to keep in your fridge.  Grapes, oranges, carrots, and celery are great choices. They'll be ready for a quick snack or an after-school treat. ? Dip raw vegetables in hummus or peanut butter. ? Keep dried fruit on hand for an easy \"take with you\" snack. · Make something sweet--and healthy. ? Try baked apples or pears topped with cinnamon and honey for a delicious dessert. ? Make chocolate chip cookies even better with grated carrots added to the mix. Where can you learn more? Go to http://www.gray.com/  Enter F050 in the search box to learn more about \"Learning About Eating More Fruits and Vegetables. \"  Current as of: August 22, 2019               Content Version: 12.6  © 1891-2307 [employer]. Care instructions adapted under license by [employer] (which disclaims liability or warranty for this information). If you have questions about a medical condition or this instruction, always ask your healthcare professional. [place] [address] any warranty or liability for your use of this information.

## 2021-01-25 NOTE — PROGRESS NOTES
Chief Complaint   Patient presents with    Weight Management     follow up    Skin Problem     dryness, itching and redness    Blood Pressure Check     1. Have you been to the ER, urgent care clinic since your last visit? Hospitalized since your last visit? No    2. Have you seen or consulted any other health care providers outside of the [address] [place] [address] since your last visit? Include any pap smears or colon screening.  No

## 2021-05-07 DIAGNOSIS — E55.9 VITAMIN D DEFICIENCY: ICD-10-CM

## 2021-05-10 RX ORDER — ASPIRIN 325 MG
TABLET, DELAYED RELEASE (ENTERIC COATED) ORAL
Qty: 12 CAP | Refills: 1 | Status: SHIPPED | OUTPATIENT
Start: 2021-05-10 | End: 2021-10-15 | Stop reason: SDUPTHER

## 2021-10-15 DIAGNOSIS — E55.9 VITAMIN D DEFICIENCY: ICD-10-CM

## 2021-10-15 NOTE — TELEPHONE ENCOUNTER
Last Visit: 1/25/21 MD Amy Ivy, labs 11/2020  Next Appointment: Not scheduled- no show 4/26/21  Previous Refill Encounter(s): 5/10/21 12 + 1    Requested Prescriptions     Pending Prescriptions Disp Refills    cholecalciferol (VITAMIN D3) (50,000 UNITS /1250 MCG) capsule 4 Capsule 0     Sig: Take 1 Capsule by mouth every seven (7) days. Appointment/labs due for further refills!

## 2021-10-27 RX ORDER — ASPIRIN 325 MG
50000 TABLET, DELAYED RELEASE (ENTERIC COATED) ORAL
Qty: 4 CAPSULE | Refills: 0 | Status: SHIPPED | OUTPATIENT
Start: 2021-10-27

## 2022-03-18 PROBLEM — Z91.89 AT RISK FOR NUTRITION DEFICIENCY: Status: ACTIVE | Noted: 2020-10-25

## 2022-03-20 PROBLEM — E66.01 OBESITY, MORBID (HCC): Status: ACTIVE | Noted: 2020-10-25

## 2022-03-20 PROBLEM — Z98.84 S/P GASTRIC BYPASS: Status: ACTIVE | Noted: 2020-10-25

## 2022-04-12 DIAGNOSIS — Z91.89 AT RISK FOR NUTRITION DEFICIENCY: ICD-10-CM

## 2022-04-12 DIAGNOSIS — E53.9 VITAMIN B DEFICIENCY: ICD-10-CM

## 2022-04-13 RX ORDER — LANOLIN ALCOHOL/MO/W.PET/CERES
CREAM (GRAM) TOPICAL
Qty: 180 TABLET | Refills: 3 | OUTPATIENT
Start: 2022-04-13

## 2022-06-22 ENCOUNTER — VIRTUAL VISIT (OUTPATIENT)
Dept: FAMILY MEDICINE CLINIC | Age: 42
End: 2022-06-22
Payer: COMMERCIAL

## 2022-06-22 DIAGNOSIS — U07.1 COVID-19: Primary | ICD-10-CM

## 2022-06-22 PROCEDURE — 99213 OFFICE O/P EST LOW 20 MIN: CPT | Performed by: STUDENT IN AN ORGANIZED HEALTH CARE EDUCATION/TRAINING PROGRAM

## 2022-06-22 NOTE — PROGRESS NOTES
Dania Nelson  36 y.o. female  1980  [address] [facility] [address]  [facility]  Telemedicine Progress Note  Med Gaviria MD       Encounter Date and Time: June 22, 2022 at 9:21 AM    Consent:  She and/or the health care decision maker is aware that that she may receive a bill for this telephone service, depending on her insurance coverage, and has provided verbal consent to proceed: Yes    Chief Complaint   Patient presents with    Positive For Covid-19     History of Present Illness   [employer] Min[facility] is a 36 y.o. female was evaluated by synchronous (real-time) audio-video technology from home, through the [facility] [address]  Started having scratchy throat 2 days ago and rapid test was negative that day. Felt worse yesterday do took a second rapid test which was positive. Just [place] a few days ago. Yesterday was more of a severe HA but that has resolved today and now with worse sore throat and deeper cough. [facility] spray and she was given paxlovid there as well but has not started it. She denies SOB, cough, wheeze, fever, chills, myalgias. Review of Systems   Review of Systems   Constitutional: Positive for malaise/fatigue. Negative for chills and fever. HENT: Positive for congestion. Respiratory: Positive for cough. Negative for shortness of breath and wheezing. Cardiovascular: Negative for chest pain, palpitations and leg swelling. Musculoskeletal: Negative for myalgias. Neurological: Negative for dizziness and headaches.        Vitals/Objective:     General: alert, cooperative, no distress, hoarse voice   Mental  status: mental status: alert, oriented to person, place, and time, normal mood, behavior, speech, dress, motor activity, and thought processes   Resp: resp: normal effort, no respiratory distress, no accessory muscle use and no cough during duration of video encounter    Neuro: neuro: no gross deficits   Skin: skin: no discoloration or lesions of concern on visible areas   Due to this being a TeleHealth evaluation, many elements of the physical examination are unable to be assessed. Assessment and Plan:   45yo F with hx of gastric bypass, obesity presenting on day 2 of COVID. She is doing ok without concerning respiratory symptoms right now and has paxlovid in case of worsening. Counseled on appropriate use and need to start within 5 days of a positive test for maximum efficiency. Reviewed OTC medications that can be used for symptomatic treatment, ED precautions and quarantine guidelines    1. COVID-19  - START paxlovid for worsening symptoms in first 5 days of positive test; discussed risk of COVID relapse after paxlovid, signs to watch for  And need to retest and restart quarantine  - symptomatic treatment  - rest and hydration         We discussed the expected course, resolution and complications of the diagnosis(es) in detail. Medication risks, benefits, costs, interactions, and alternatives were discussed as indicated. I advised her to contact the office if her condition worsens, changes or fails to improve as anticipated. She expressed understanding with the diagnosis(es) and plan. Patient understands that this encounter was a temporary measure, and the importance of further follow up and examination was emphasized. Patient verbalized understanding. Patient informed to follow up: Follow-up and Dispositions  ·   Return in about 1 month (around 7/22/2022) for routine physical exam with labs .          Electronically Signed: Darya Holman MD    Providers location when delivering service: clinic    Pursuant to the emergency declaration under the [facility], [facility] waiver authority and the byyd and [employer] Act, this Virtual  Visit was conducted, with patient's consent, to reduce the patient's risk of exposure to COVID-19 and provide continuity of care for an established patient. Services were provided through a video synchronous discussion virtually to substitute for in-person clinic visit. History   Patients past medical, surgical and family histories were reviewed and updated.       Past Medical History:   Diagnosis Date    History of blood transfusion      Past Surgical History:   Procedure Laterality Date    HX GASTRIC BYPASS  1997     Family History   Problem Relation Age of Onset    Elevated Lipids Mother     Hypertension Mother     Thyroid Disease Mother     Diabetes Father     Hypertension Father    Hayde Miles Elevated Lipids Father     Sleep Apnea Father     Mult Sclerosis Maternal Aunt     Hypertension Paternal Aunt     Elevated Lipids Paternal Aunt     Lung Cancer Maternal Grandfather     Diabetes Maternal Grandfather     Heart Disease Maternal Grandfather     Elevated Lipids Maternal Grandfather     Heart Disease Paternal Grandmother     Hypertension Paternal Grandmother     Elevated Lipids Paternal Grandmother     Elevated Lipids Paternal Grandfather     Hypertension Paternal Grandfather      Social History     Socioeconomic History    Marital status: SINGLE     Spouse name: Not on file    Number of children: Not on file    Years of education: Not on file    Highest education level: Not on file   Occupational History    Not on file   Tobacco Use    Smoking status: Never Smoker    Smokeless tobacco: Never Used   Vaping Use    Vaping Use: Never used   Substance and Sexual Activity    Alcohol use: Yes     Comment: occasional    Drug use: Never    Sexual activity: Not Currently   Other Topics Concern    Not on file   Social History Narrative    Not on file     Social Determinants of Health     Financial Resource Strain:     Difficulty of Paying Living Expenses: Not on file   Food Insecurity:     Worried About Running Out of Food in the Last Year: Not on file    Manuel sorenson Food in the Last Year: Not on file   Transportation Needs:     Lack of Transportation (Medical): Not on file    Lack of Transportation (Non-Medical): Not on file   Physical Activity:     Days of Exercise per Week: Not on file    Minutes of Exercise per Session: Not on file   Stress:     Feeling of Stress : Not on file   Social Connections:     Frequency of Communication with Friends and Family: Not on file    Frequency of Social Gatherings with Friends and Family: Not on file    Attends Evangelical Services: Not on file    Active Member of [address] [place] [address] [place] or Organizations: Not on file    Attends Club or Organization Meetings: Not on file    Marital Status: Not on file   Intimate Partner Violence:     Fear of Current or Ex-Partner: Not on file    Emotionally Abused: Not on file    Physically Abused: Not on file    Sexually Abused: Not on file   Housing Stability:     Unable to Pay for Housing in the Last Year: Not on file    Number of [place] in the Last Year: Not on file    Unstable Housing in the Last Year: Not on file     Patient Active Problem List   Diagnosis Code    Obesity, morbid ([facility] E66.01    At risk for nutrition deficiency Z91.89    S/P gastric bypass Z98.84          Current Medications/Allergies   Medications and Allergies reviewed:    Current Outpatient Medications   Medication Sig Dispense Refill    cholecalciferol (VITAMIN D3) (50,000 UNITS /1250 MCG) capsule Take 1 Capsule by mouth every seven (7) days. Appointment/labs due for further refills! 4 Capsule 0    selenium sulfide 2.5 % lotion       cyanocobalamin (Vitamin B-12) 1,000 mcg tablet Take 2 Tabs by mouth daily.  Take 2 tabs daily for 2 weeks then take 1 tab daily 180 Tab 3    ferrous sulfate (Iron) 325 mg (65 mg iron) tablet iron      acyclovir (ZOVIRAX) 400 mg tablet TAKE 1 TABLET BY MOUTH EVERY DAY      levocetirizine (Xyzal) 5 mg tablet       diphenhydrAMINE (BenadryL) 25 mg capsule       minocycline (MINOCIN, DYNACIN) 100 mg capsule TAKE ONE CAPSULE BY MOUTH DAILY WITH FOOD      nystatin-triamcinolone (MYCOLOG) 100,000-0.1 unit/gram-% ointment nystatin-triamcinolone 100,000 unit/gram-0.1 % topical ointment       Allergies   Allergen Reactions    Ibuprofen Other (comments)    Nsaids (Non-Steroidal Anti-Inflammatory Drug) Other (comments)    Shrimp Swelling

## 2022-07-13 ENCOUNTER — OFFICE VISIT (OUTPATIENT)
Dept: FAMILY MEDICINE CLINIC | Age: 42
End: 2022-07-13
Payer: COMMERCIAL

## 2022-07-13 VITALS
HEIGHT: 65 IN | HEART RATE: 82 BPM | BODY MASS INDEX: 48.82 KG/M2 | DIASTOLIC BLOOD PRESSURE: 66 MMHG | WEIGHT: 293 LBS | OXYGEN SATURATION: 98 % | SYSTOLIC BLOOD PRESSURE: 106 MMHG | RESPIRATION RATE: 18 BRPM | TEMPERATURE: 97.2 F

## 2022-07-13 DIAGNOSIS — M54.50 CHRONIC LOW BACK PAIN WITHOUT SCIATICA, UNSPECIFIED BACK PAIN LATERALITY: ICD-10-CM

## 2022-07-13 DIAGNOSIS — L71.9 ROSACEA, ACNE: ICD-10-CM

## 2022-07-13 DIAGNOSIS — Z00.00 WELL WOMAN EXAM WITHOUT GYNECOLOGICAL EXAM: Primary | ICD-10-CM

## 2022-07-13 DIAGNOSIS — E66.01 OBESITY, MORBID (HCC): ICD-10-CM

## 2022-07-13 DIAGNOSIS — Z98.84 S/P GASTRIC BYPASS: ICD-10-CM

## 2022-07-13 DIAGNOSIS — Z86.19 H/O COLD SORES: ICD-10-CM

## 2022-07-13 DIAGNOSIS — G89.29 CHRONIC LOW BACK PAIN WITHOUT SCIATICA, UNSPECIFIED BACK PAIN LATERALITY: ICD-10-CM

## 2022-07-13 PROBLEM — E55.9 VITAMIN D DEFICIENCY: Status: ACTIVE | Noted: 2018-07-27

## 2022-07-13 PROCEDURE — 99213 OFFICE O/P EST LOW 20 MIN: CPT | Performed by: STUDENT IN AN ORGANIZED HEALTH CARE EDUCATION/TRAINING PROGRAM

## 2022-07-13 PROCEDURE — 99396 PREV VISIT EST AGE 40-64: CPT | Performed by: STUDENT IN AN ORGANIZED HEALTH CARE EDUCATION/TRAINING PROGRAM

## 2022-07-13 RX ORDER — CYCLOBENZAPRINE HCL 10 MG
10 TABLET ORAL
Qty: 90 TABLET | Refills: 0 | Status: SHIPPED | OUTPATIENT
Start: 2022-07-13

## 2022-07-13 RX ORDER — NORGESTIMATE AND ETHINYL ESTRADIOL 0.25-0.035
1 KIT ORAL DAILY
COMMUNITY
Start: 2022-06-30

## 2022-07-13 RX ORDER — ACYCLOVIR 400 MG/1
400 TABLET ORAL DAILY
Qty: 60 TABLET | Refills: 0 | Status: SHIPPED | OUTPATIENT
Start: 2022-07-13 | End: 2022-09-14

## 2022-07-13 NOTE — PROGRESS NOTES
Chief Complaint   Patient presents with    Complete Physical     1. \"Have you been to the ER, urgent care clinic since your last visit? Hospitalized since your last visit? \" No    2. \"Have you seen or consulted any other health care providers outside of the [address] [place] [address] since your last visit? \" No     3. For patients aged 39-70: Has the patient had a colonoscopy / FIT/ Cologuard? NA - based on age      If the patient is female:    4. For patients aged 41-77: Has the patient had a mammogram within the past 2 years? No      5. For patients aged 21-65: Has the patient had a pap smear?  No

## 2022-07-13 NOTE — PROGRESS NOTES
Ok Snow  36 y.o. female  1980  [address] [facility] [address]  [id number]     [address] [place] [address]       Chief Complaint: CPE  Source: self     HPI:  Ok Snow is a 36 y.o. female presenting for well woman exam.     Obesity  Diet: two coke zeros a day, only water otherwise. Does eat takeout and has been trying to reduce that. Exercise: went to [facility] 7 times this last year - lots of walking     Intertrigo  - Nystatin prn for exacerbations    Chronic Low Back Pain  - previously saw chiropractor   - uses flexeril prn for muscle spasms    Hx Vit D Deficiency, B12 supplementation s/p Gastric Bypass at age 24  - currently taking high dose supplement Vit D + B12 + Iron    Hx of symptomatic Anemia 2/2 DUB  - s/p transfusion and endoscopy  - on iron chronically     Gyn Hx  Sees [facility] [place], C next visit 2 weeks  On OCPs for menorrhagia - was off for 1 year during her 45s with heavy bleeding (has needs transfusion in the past)    Rosacea  - on minocycline and selenium sulfide ointment  - sees dermatology     Allergies- reviewed: Allergies   Allergen Reactions    Ibuprofen Other (comments)    Nsaids (Non-Steroidal Anti-Inflammatory Drug) Other (comments)    Shrimp Swelling         Medications- reviewed:   Current Outpatient Medications   Medication Sig    norgestimate-ethinyl estradioL (ORTHO-CYCLEN, SPRINTEC) 0.25-35 mg-mcg tab Take 1 Tablet by mouth daily.  acyclovir (ZOVIRAX) 400 mg tablet Take 1 Tablet by mouth daily. (Patient taking differently: Take 400 mg by mouth daily as needed. Indications: a herpes simplex infection)    cholecalciferol (VITAMIN D3) (50,000 UNITS /1250 MCG) capsule Take 1 Capsule by mouth every seven (7) days. Appointment/labs due for further refills!  selenium sulfide 2.5 % lotion     cyanocobalamin (Vitamin B-12) 1,000 mcg tablet Take 2 Tabs by mouth daily.  Take 2 tabs daily for 2 weeks then take 1 tab daily    ferrous sulfate (Iron) 325 mg (65 mg iron) tablet iron    levocetirizine (Xyzal) 5 mg tablet     minocycline (MINOCIN, DYNACIN) 100 mg capsule TAKE ONE CAPSULE BY MOUTH DAILY WITH FOOD    nystatin-triamcinolone (MYCOLOG) 100,000-0.1 unit/gram-% ointment nystatin-triamcinolone 100,000 unit/gram-0.1 % topical ointment    cyclobenzaprine (FLEXERIL) 10 mg tablet Take 1 Tablet by mouth three (3) times daily as needed for Muscle Spasm(s). (Patient not taking: Reported on 7/13/2022)    diphenhydrAMINE (BenadryL) 25 mg capsule  (Patient not taking: Reported on 7/13/2022)     No current facility-administered medications for this visit.          Past Medical History- reviewed:  Past Medical History:   Diagnosis Date    History of blood transfusion          Past Surgical History- reviewed:   Past Surgical History:   Procedure Laterality Date    HX GASTRIC BYPASS  1997         Family History - reviewed:  Family History   Problem Relation Age of Onset    Elevated Lipids Mother     Hypertension Mother     Thyroid Disease Mother     Diabetes Father     Hypertension Father    Thorne Elevated Lipids Father     Sleep Apnea Father     Mult Sclerosis Maternal Aunt     Hypertension Paternal Aunt     Elevated Lipids Paternal Aunt     Lung Cancer Maternal Grandfather     Diabetes Maternal Grandfather     Heart Disease Maternal Grandfather     Elevated Lipids Maternal Grandfather     Heart Disease Paternal Grandmother     Hypertension Paternal Grandmother     Elevated Lipids Paternal Grandmother     Elevated Lipids Paternal Grandfather     Hypertension Paternal Grandfather          Social History - reviewed:  Social History     Socioeconomic History    Marital status: SINGLE     Spouse name: Not on file    Number of children: Not on file    Years of education: Not on file    Highest education level: Not on file   Occupational History    Not on file   Tobacco Use    Smoking status: Never Smoker    Smokeless tobacco: Never Used   Vaping Use    Vaping Use: Never used   Substance and Sexual Activity    Alcohol use: Yes     Comment: occasional    Drug use: Never    Sexual activity: Not Currently   Other Topics Concern    Not on file   Social History Narrative    Not on file     Social Determinants of Health     Financial Resource Strain:     Difficulty of Paying Living Expenses: Not on file   Food Insecurity:     Worried About Running Out of Food in the Last Year: Not on file    Manuel of Food in the Last Year: Not on file   Transportation Needs:     Lack of Transportation (Medical): Not on file    Lack of Transportation (Non-Medical):  Not on file   Physical Activity:     Days of Exercise per Week: Not on file    Minutes of Exercise per Session: Not on file   Stress:     Feeling of Stress : Not on file   Social Connections:     Frequency of Communication with Friends and Family: Not on file    Frequency of Social Gatherings with Friends and Family: Not on file    Attends Latter-day Services: Not on file    Active Member of [address] [place] [address] or Organizations: Not on file    Attends Club or Organization Meetings: Not on file    Marital Status: Not on file   Intimate Partner Violence:     Fear of Current or Ex-Partner: Not on file    Emotionally Abused: Not on file    Physically Abused: Not on file    Sexually Abused: Not on file   Housing Stability:     Unable to Pay for Housing in the Last Year: Not on file    Number of [place] in the Last Year: Not on file    Unstable Housing in the Last Year: Not on file         Immunizations - reviewed:   Immunization History   Administered Date(s) Administered    COVID-19, PFIZER PURPLE top, DILUTE for use, (age 15 y+), IM, 30mcg/0.3mL 01/27/2021, 02/24/2021, 09/25/2021    Influenza Vaccine 10/21/2021    Influenza Vaccine (Quad) PF (>6 Mo Flulaval, Fluarix, and [address], [place] [address]) 10/23/2020    Tdap 10/23/2020     Flu: due in the fall   Tdap: UTD  Pneumovax: not indicated based on age  Zostervax: not indicated based on age      Health Maintenance reviewed -  Pap smear UTD, NILM   Mammogram done with ObGyn, UTD  Colonoscopy paternal uncle - prostate or colon? At 87 years   Hepatitis C testing today  Lung cancer screening none, no 2nd hand     Review of Systems   Constitutional: Negative for chills, fever, malaise/fatigue and weight loss. HENT: Negative for congestion and hearing loss. Eyes: Negative for blurred vision and double vision. Respiratory: Negative for cough and shortness of breath. Cardiovascular: Negative for chest pain and palpitations. Gastrointestinal: Negative for constipation, diarrhea, heartburn and nausea. Genitourinary: Negative for dysuria and urgency. Musculoskeletal: Positive for back pain. Negative for myalgias. Skin: Positive for itching. Negative for rash. Neurological: Negative for dizziness and headaches. Psychiatric/Behavioral: Negative for depression. The patient is not nervous/anxious and does not have insomnia. BREASTS: No masses or nipple discharge      Physical Exam  Visit Vitals  /66 (BP 1 Location: Left upper arm, BP Patient Position: Sitting, BP Cuff Size: Large adult)   Pulse 82   Temp 97.2 °F (36.2 °C) (Temporal)   Resp 18   Ht 5' 5\" (1.651 m)   Wt 334 lb 12.8 oz (151.9 kg)   LMP 06/19/2022 (Exact Date)   SpO2 98%   BMI 55.71 kg/m²       General appearance - alert, well appearing, and in no distress.  obese  Eyes - pupils equal and reactive, extraocular eye movements intact  Ears - bilateral TM's and external ear canals normal  Nose - normal and patent, no erythema, discharge or polyps  Mouth - mucous membranes moist, pharynx normal without lesions  Neck - supple, no significant adenopathy  Chest - clear to auscultation, no wheezes, rales or rhonchi, symmetric air entry  Heart - normal rate, regular rhythm, normal S1, S2, no murmurs, rubs, clicks or gallops  Abdomen - soft, nontender, nondistended, no masses or organomegaly  Neurological - alert, oriented, normal speech, no focal findings or movement disorder noted  Musculoskeletal - no joint tenderness, deformity or swelling, no muscular tenderness noted, full range of motion without pain  Extremities - peripheral pulses normal, no pedal edema, no clubbing or cyanosis  Skin - normal coloration and turgor, no rashes, no suspicious skin lesions noted      Assessment/Plan:    ICD-10-CM ICD-9-CM    1. Well woman exam without gynecological exam  Z00.00 V70.0 CBC W/O DIFF      METABOLIC PANEL, COMPREHENSIVE      LIPID PANEL      HEMOGLOBIN A1C WITH EAG      TSH 3RD GENERATION      HEPATITIS C AB      VITAMIN D, 25 HYDROXY      CBC W/O DIFF      METABOLIC PANEL, COMPREHENSIVE      LIPID PANEL      HEMOGLOBIN A1C WITH EAG      TSH 3RD GENERATION      HEPATITIS C AB      VITAMIN D, 25 HYDROXY   2. S/P gastric bypass  Z98.84 V45.86 VITAMIN B12 & FOLATE      VITAMIN B12 & FOLATE   3. H/O cold sores  Z86.19 V12.09 acyclovir (ZOVIRAX) 400 mg tablet   4. Rosacea, acne  L71.9 695.3  continue minocycline, selenum sulfide cream   5. Chronic low back pain without sciatica, unspecified back pain laterality  M54.50 724.2 cyclobenzaprine (FLEXERIL) 10 mg tablet    G89.29 338.29      · Counseled re: diet, exercise, healthy lifestyle    · Appropriate labs, vaccines, imaging studies, and referrals ordered as listed above    · Discussed the patient's BMI with her. The BMI follow up plan is as follows: I have counseled this patient on diet and exercise regimens. - Discussed health risks of obesity including increased risk of HTN, heart disease, diabetes, anxiety and depression   - counseled on diet, reviewed ADA plate handout   - f/u Q3mo for obesity to monitor weight changes with lifestyle modifications    Follow-up and Dispositions    · Return in about 3 months (around 10/13/2022) for weight management .            I have discussed the diagnosis with the patient and the intended plan as seen in the above orders. Patient verbalized understanding of the plan and agrees with the plan. The patient has received an after-visit summary and questions were answered concerning future plans. I have discussed medication side effects and warnings with the patient as well. Informed patient to return to the office if new symptoms arise.       Taye Carlton MD, MD  [facility], [facility]

## 2022-07-14 LAB
25(OH)D3+25(OH)D2 SERPL-MCNC: 57.9 NG/ML (ref 30–100)
ALBUMIN SERPL-MCNC: 4 G/DL (ref 3.8–4.8)
ALBUMIN/GLOB SERPL: 1.5 {RATIO} (ref 1.2–2.2)
ALP SERPL-CCNC: 89 IU/L (ref 44–121)
ALT SERPL-CCNC: 11 IU/L (ref 0–32)
AST SERPL-CCNC: 14 IU/L (ref 0–40)
BILIRUB SERPL-MCNC: 0.3 MG/DL (ref 0–1.2)
BUN SERPL-MCNC: 8 MG/DL (ref 6–24)
BUN/CREAT SERPL: 15 (ref 9–23)
CALCIUM SERPL-MCNC: 8.9 MG/DL (ref 8.7–10.2)
CHLORIDE SERPL-SCNC: 104 MMOL/L (ref 96–106)
CHOLEST SERPL-MCNC: 169 MG/DL (ref 100–199)
CO2 SERPL-SCNC: 21 MMOL/L (ref 20–29)
CREAT SERPL-MCNC: 0.53 MG/DL (ref 0.57–1)
EGFR: 118 ML/MIN/1.73
ERYTHROCYTE [DISTWIDTH] IN BLOOD BY AUTOMATED COUNT: 13.5 % (ref 11.7–15.4)
EST. AVERAGE GLUCOSE BLD GHB EST-MCNC: 111 MG/DL
FOLATE SERPL-MCNC: 8.8 NG/ML
GLOBULIN SER CALC-MCNC: 2.6 G/DL (ref 1.5–4.5)
GLUCOSE SERPL-MCNC: 99 MG/DL (ref 65–99)
HBA1C MFR BLD: 5.5 % (ref 4.8–5.6)
HCT VFR BLD AUTO: 44.4 % (ref 34–46.6)
HCV AB S/CO SERPL IA: <0.1 S/CO RATIO (ref 0–0.9)
HDLC SERPL-MCNC: 46 MG/DL
HGB BLD-MCNC: 14.5 G/DL (ref 11.1–15.9)
IMP & REVIEW OF LAB RESULTS: NORMAL
LDLC SERPL CALC-MCNC: 103 MG/DL (ref 0–99)
MCH RBC QN AUTO: 28.8 PG (ref 26.6–33)
MCHC RBC AUTO-ENTMCNC: 32.7 G/DL (ref 31.5–35.7)
MCV RBC AUTO: 88 FL (ref 79–97)
PLATELET # BLD AUTO: 206 X10E3/UL (ref 150–450)
POTASSIUM SERPL-SCNC: 4.4 MMOL/L (ref 3.5–5.2)
PROT SERPL-MCNC: 6.6 G/DL (ref 6–8.5)
RBC # BLD AUTO: 5.03 X10E6/UL (ref 3.77–5.28)
SODIUM SERPL-SCNC: 139 MMOL/L (ref 134–144)
TRIGL SERPL-MCNC: 109 MG/DL (ref 0–149)
TSH SERPL DL<=0.005 MIU/L-ACNC: 2.47 UIU/ML (ref 0.45–4.5)
VIT B12 SERPL-MCNC: 993 PG/ML (ref 232–1245)
VLDLC SERPL CALC-MCNC: 20 MG/DL (ref 5–40)
WBC # BLD AUTO: 9 X10E3/UL (ref 3.4–10.8)

## 2022-07-19 PROBLEM — Z12.4 PAPANICOLAOU SMEAR: Status: ACTIVE | Noted: 2020-01-09

## 2022-07-19 PROBLEM — Z92.89 H/O MAMMOGRAM: Status: ACTIVE | Noted: 2021-06-03

## 2022-07-22 NOTE — PROGRESS NOTES
CBC, CMP, TSH, A1c, Vit D, B12 & Folate WNL  Hep C screen neg  10-year ASCVD  0.5% - no statin indicated     MyChart message sent to patient

## 2022-08-18 PROBLEM — Z12.4 PAPANICOLAOU SMEAR: Status: RESOLVED | Noted: 2020-01-09 | Resolved: 2022-08-18

## 2022-10-13 ENCOUNTER — OFFICE VISIT (OUTPATIENT)
Dept: FAMILY MEDICINE CLINIC | Age: 42
End: 2022-10-13
Payer: COMMERCIAL

## 2022-10-13 VITALS
BODY MASS INDEX: 48.82 KG/M2 | HEIGHT: 65 IN | DIASTOLIC BLOOD PRESSURE: 78 MMHG | OXYGEN SATURATION: 98 % | WEIGHT: 293 LBS | RESPIRATION RATE: 18 BRPM | TEMPERATURE: 97.3 F | SYSTOLIC BLOOD PRESSURE: 112 MMHG | HEART RATE: 73 BPM

## 2022-10-13 DIAGNOSIS — Z91.89 AT RISK FOR NUTRITION DEFICIENCY: ICD-10-CM

## 2022-10-13 DIAGNOSIS — E53.9 VITAMIN B DEFICIENCY: ICD-10-CM

## 2022-10-13 DIAGNOSIS — Z98.84 S/P GASTRIC BYPASS: ICD-10-CM

## 2022-10-13 DIAGNOSIS — E66.01 OBESITY, MORBID (HCC): Primary | ICD-10-CM

## 2022-10-13 DIAGNOSIS — Z23 ENCOUNTER FOR IMMUNIZATION: ICD-10-CM

## 2022-10-13 DIAGNOSIS — L71.9 ROSACEA, ACNE: ICD-10-CM

## 2022-10-13 PROCEDURE — 90686 IIV4 VACC NO PRSV 0.5 ML IM: CPT | Performed by: STUDENT IN AN ORGANIZED HEALTH CARE EDUCATION/TRAINING PROGRAM

## 2022-10-13 PROCEDURE — 99214 OFFICE O/P EST MOD 30 MIN: CPT | Performed by: STUDENT IN AN ORGANIZED HEALTH CARE EDUCATION/TRAINING PROGRAM

## 2022-10-13 PROCEDURE — 90471 IMMUNIZATION ADMIN: CPT | Performed by: STUDENT IN AN ORGANIZED HEALTH CARE EDUCATION/TRAINING PROGRAM

## 2022-10-13 RX ORDER — SELENIUM SULFIDE 2.5 MG/100ML
1 LOTION TOPICAL
Qty: 3 EACH | Refills: 1 | Status: SHIPPED | OUTPATIENT
Start: 2022-10-14

## 2022-10-13 RX ORDER — LANOLIN ALCOHOL/MO/W.PET/CERES
2000 CREAM (GRAM) TOPICAL DAILY
Qty: 180 TABLET | Refills: 3 | Status: SHIPPED | OUTPATIENT
Start: 2022-10-13

## 2022-10-13 RX ORDER — MINOCYCLINE HYDROCHLORIDE 100 MG/1
CAPSULE ORAL
Qty: 60 CAPSULE | Refills: 0 | Status: SHIPPED | OUTPATIENT
Start: 2022-10-13

## 2022-10-13 NOTE — PROGRESS NOTES
Chief Complaint   Patient presents with    Weight Management         1. \"Have you been to the ER, urgent care clinic since your last visit? Hospitalized since your last visit? \" No    2. \"Have you seen or consulted any other health care providers outside of the [address] [place] [address] since your last visit? \"  GYN:   6/3/22    3. For patients aged 39-70: Has the patient had a colonoscopy / FIT/ Cologuard? NA - based on age      If the patient is female:    4. For patients aged 41-77: Has the patient had a mammogram within the past 2 years? Yes - no Care Gap present      5. For patients aged 21-65: Has the patient had a pap smear? Yes - no Care Gap present         3 most recent PHQ Screens 10/13/2022   Little interest or pleasure in doing things Not at all   Feeling down, depressed, irritable, or hopeless Not at all   Total Score PHQ 2 0   Trouble falling or staying asleep, or sleeping too much -   Feeling tired or having little energy -   Poor appetite, weight loss, or overeating -   Feeling bad about yourself - or that you are a failure or have let yourself or your family down -   Trouble concentrating on things such as school, work, reading, or watching TV -   Moving or speaking so slowly that other people could have noticed; or the opposite being so fidgety that others notice -   Thoughts of being better off dead, or hurting yourself in some way -   PHQ 9 Score -   How difficult have these problems made it for you to do your work, take care of your home and get along with others -       Health Maintenance Due   Topic Date Due    Flu Vaccine (1) 08/01/2022     Laurel Vazquez is a 36 y.o. female  who presents for Flu immunizations. she denies any symptoms , reactions or allergies that would exclude them from being immunized today. Risks and adverse reactions were discussed and the VIS was given to them. All questions were addressed. she was observed for 10 min post injection.  There were no reactions observed. LOT:[id number]  NDC: 09848-430-45  EXP.  Date: 06/21/23  Sussy Brown LPN

## 2022-10-13 NOTE — PROGRESS NOTES
Neel Trujillo  36 y.o. female  1980  [address] [place] [address] Court  [id number]     [facility]       Chief Complaint: weight management  Source: self    Subjective  Neel Trujillo is an 36 y.o. female who presents for weight management. She has a remote hx of gastric bypass and continues supplements from this surgery. At last check all electrolyte and vitamin levels were normal. Last weight 334lb, BMI 55.71 - same today. Ordered factor meals starting 2 weeks ago to limit needing to get fast food for lunch. Over the last month she has tried to be more conscious with preparing lunches or ordering. No sugary drinks. She discussed weight management options at her OB visit including the injectable GLP-1 inhibitors and she would like to trial that. Also having significant rosacea flare for the last few weeks and out of minocycline. Allergies - reviewed: Allergies   Allergen Reactions    Ibuprofen Other (comments)    Nsaids (Non-Steroidal Anti-Inflammatory Drug) Other (comments)    Shrimp Swelling         Medications - reviewed:   Current Outpatient Medications   Medication Sig    semaglutide, weight loss, 0.25 mg/0.5 mL pnij 0.25 mg by SubCUTAneous route every seven (7) days. Indications: weight loss management for an obese person    cyanocobalamin (Vitamin B-12) 1,000 mcg tablet Take 2 Tablets by mouth daily. Take 2 tabs daily for 2 weeks then take 1 tab daily    minocycline (MINOCIN, DYNACIN) 100 mg capsule TAKE ONE CAPSULE BY MOUTH DAILY WITH FOOD    [START ON 10/14/2022] selenium sulfide 2.5 % lotion Apply 1 Bottle to affected area every Monday, Wednesday, Friday. acyclovir (ZOVIRAX) 400 mg tablet Take 1 Tablet by mouth daily as needed (cold sore). Indications: a herpes simplex infection    norgestimate-ethinyl estradioL (ORTHO-CYCLEN, SPRINTEC) 0.25-35 mg-mcg tab Take 1 Tablet by mouth daily.     cyclobenzaprine (FLEXERIL) 10 mg tablet Take 1 Tablet by mouth three (3) times daily as needed for Muscle Spasm(s). cholecalciferol (VITAMIN D3) (50,000 UNITS /1250 MCG) capsule Take 1 Capsule by mouth every seven (7) days. Appointment/labs due for further refills! ferrous sulfate 325 mg (65 mg iron) tablet iron    levocetirizine (XYZAL) 5 mg tablet     nystatin-triamcinolone (MYCOLOG) 100,000-0.1 unit/gram-% ointment nystatin-triamcinolone 100,000 unit/gram-0.1 % topical ointment    diphenhydrAMINE (BENADRYL) 25 mg capsule  (Patient not taking: No sig reported)     No current facility-administered medications for this visit.          Past Medical History - reviewed:  Past Medical History:   Diagnosis Date    Anemia 2/25/2007    History of blood transfusion          Past Surgical History - reviewed:   Past Surgical History:   Procedure Laterality Date    HX GASTRIC BYPASS  1997         Social History - reviewed:  Social History     Socioeconomic History    Marital status: SINGLE     Spouse name: Not on file    Number of children: Not on file    Years of education: Not on file    Highest education level: Not on file   Occupational History    Not on file   Tobacco Use    Smoking status: Never     Passive exposure: Never    Smokeless tobacco: Never   Vaping Use    Vaping Use: Never used   Substance and Sexual Activity    Alcohol use: Yes     Comment: occasional    Drug use: Never    Sexual activity: Not Currently   Other Topics Concern    Not on file   Social History Narrative    Not on file     Social Determinants of Health     Financial Resource Strain: Low Risk     Difficulty of Paying Living Expenses: Not hard at all   Food Insecurity: No Food Insecurity    Worried About Running Out of Food in the Last Year: Never true    Ran Out of Food in the Last Year: Never true   Transportation Needs: Not on file   Physical Activity: Not on file   Stress: Not on file   Social Connections: Not on file   Intimate Partner Violence: Not on file   Housing Stability: Not on file         Family History - reviewed:  Family History   Problem Relation Age of Onset    Elevated Lipids Mother     Hypertension Mother     Thyroid Disease Mother     Diabetes Father     Hypertension Father     Elevated Lipids Father     Sleep Apnea Father     Mult Sclerosis Maternal Aunt     Hypertension Paternal Aunt     Elevated Lipids Paternal Aunt     Lung Cancer Maternal Grandfather     Diabetes Maternal Grandfather     Heart Disease Maternal Grandfather     Elevated Lipids Maternal Grandfather     Heart Disease Paternal Grandmother     Hypertension Paternal Grandmother     Elevated Lipids Paternal Grandmother     Elevated Lipids Paternal Grandfather     Hypertension Paternal Grandfather          Immunizations - reviewed:   Immunization History   Administered Date(s) Administered    COVID-19, PFIZER PURPLE top, DILUTE for use, (age 15 y+), IM, 30mcg/0.3mL 01/27/2021, 02/24/2021, 09/25/2021, 09/09/2022    Influenza Vaccine 10/21/2021    Influenza, FLUARIX, FLULAVAL, [employer]ra Brash (age 10 mo+) AND AFLURIA, (age 1 y+), PF, 0.5mL 10/23/2020, 10/13/2022    Tdap 10/23/2020     Flu: done today      Review of Systems   Constitutional:  Negative for malaise/fatigue. Cardiovascular:  Negative for chest pain and palpitations. Musculoskeletal:  Negative for myalgias. Neurological:  Negative for dizziness and headaches. Physical Exam  Visit Vitals  /78 (BP 1 Location: Left upper arm, BP Patient Position: Sitting, BP Cuff Size: Large adult)   Pulse 73   Temp 97.3 °F (36.3 °C) (Temporal)   Resp 18   Ht 5' 5\" (1.651 m)   Wt 334 lb 12.8 oz (151.9 kg)   LMP 09/29/2022 (Exact Date)   SpO2 98%   BMI 55.71 kg/m²     Wt Readings from Last 3 Encounters:   10/13/22 334 lb 12.8 oz (151.9 kg)   07/13/22 334 lb 12.8 oz (151.9 kg)   01/25/21 (!) 352 lb (159.7 kg)       Physical Exam  Vitals and nursing note reviewed. Constitutional:       General: She is not in acute distress. Appearance: Normal appearance. She is obese.  She is not ill-appearing, toxic-appearing or diaphoretic. Cardiovascular:      Rate and Rhythm: Normal rate and regular rhythm. Pulses: Normal pulses. Heart sounds: Normal heart sounds. No murmur heard. No friction rub. No gallop. Pulmonary:      Effort: Pulmonary effort is normal. No respiratory distress. Breath sounds: Normal breath sounds. No wheezing or rales. Musculoskeletal:      Right lower leg: No edema. Left lower leg: No edema. Skin:     Comments: Erythema over nasal bridge and cheeks bilaterally with developing comedones. No open nor draining sores    Neurological:      Mental Status: She is alert. Assessment/Plan    ICD-10-CM ICD-9-CM    1. Obesity, morbid ([facility]  E66.01 278.01 semaglutide, weight loss, 0.25 mg/0.5 mL pnij      2. S/P gastric bypass  Z98.84 V45.86 cyanocobalamin (Vitamin B-12) 1,000 mcg tablet      3. At risk for nutrition deficiency  Z91.89 V49.89 cyanocobalamin (Vitamin B-12) 1,000 mcg tablet      4. Vitamin B deficiency  E53.9 266.9 cyanocobalamin (Vitamin B-12) 1,000 mcg tablet      5. Rosacea, acne  L71.9 695.3 minocycline (MINOCIN, DYNACIN) 100 mg capsule      selenium sulfide 2.5 % lotion      6. Encounter for immunization  Z23 V03.89 INFLUENZA, FLUARIX, FLULAVAL, FLUZONE (AGE 6 MO+), AFLURIA(AGE 3Y+) IM, PF, 0.5 ML      AR IMMUNIZ ADMIN,1 SINGLE/COMB VAC/TOXOID          Laymon Maryann is an 36 y.o. female with hs of morbid obesity s/p gastric bypass with maintenance fo weight over the last several months despite lifestyle changes. Discussed weight loss medication options with her today and will trial semaglutide. 1. Obesity, morbid ([facility]  - continue meal planning and prep  - semaglutide, weight loss, 0.25 mg/0.5 mL pnij; 0.25 mg by SubCUTAneous route every seven (7) days. Indications: weight loss management for an obese person  Dispense: 4 Each; Refill: 0  - RTC x 3 months    2.  S/P gastric bypass  - cyanocobalamin (Vitamin B-12) 1,000 mcg tablet; Take 2 Tablets by mouth daily. Take 2 tabs daily for 2 weeks then take 1 tab daily  Dispense: 180 Tablet; Refill: 3    3. At risk for nutrition deficiency  - cyanocobalamin (Vitamin B-12) 1,000 mcg tablet; Take 2 Tablets by mouth daily. Take 2 tabs daily for 2 weeks then take 1 tab daily  Dispense: 180 Tablet; Refill: 3    4. Vitamin B deficiency  - cyanocobalamin (Vitamin B-12) 1,000 mcg tablet; Take 2 Tablets by mouth daily. Take 2 tabs daily for 2 weeks then take 1 tab daily  Dispense: 180 Tablet; Refill: 3    5. Rosacea, acne  - minocycline (MINOCIN, DYNACIN) 100 mg capsule; TAKE ONE CAPSULE BY MOUTH DAILY WITH FOOD  Dispense: 60 Capsule; Refill: 0  - selenium sulfide 2.5 % lotion; Apply 1 Bottle to affected area every Monday, Wednesday, Friday. Dispense: 3 Each; Refill: 1    6. Encounter for immunization  - INFLUENZA, FLUARIX, FLULAVAL, FLUZONE (AGE 6 MO+), AFLURIA(AGE 3Y+) IM, PF, 0.5 ML  - MO IMMUNIZ ADMIN,1 SINGLE/COMB VAC/TOXOID      Patient informed to follow up: Follow-up and Dispositions    Return in about 3 months (around 1/13/2023) for weight management . I have discussed the diagnosis with the patient and the intended plan as seen in the above orders. Patient verbalized understanding of the plan and agrees with the plan. The patient has received an after-visit summary and questions were answered concerning future plans. I have discussed medication side effects and warnings with the patient as well. Informed patient to return to the office if new symptoms arise.       Emily Wiggins MD  [facility]

## 2023-01-13 ENCOUNTER — VIRTUAL VISIT (OUTPATIENT)
Dept: FAMILY MEDICINE CLINIC | Age: 43
End: 2023-01-13
Payer: COMMERCIAL

## 2023-01-13 DIAGNOSIS — E66.01 OBESITY, MORBID (HCC): Primary | ICD-10-CM

## 2023-01-13 DIAGNOSIS — Z98.84 S/P GASTRIC BYPASS: ICD-10-CM

## 2023-01-13 NOTE — Clinical Note
Please make patient an appointment on a W/Th/F for \"followup for weight management\" with me. Any time of day works - no need to call she says will see in [address] Thank you!

## 2023-01-13 NOTE — PROGRESS NOTES
Duncan Rivera  36 y.o. female  1980  [address] [facility] [address]  [address] [facility]  Telemedicine Progress Note  Wai Darden MD       Encounter Date and Time: January 13, 2023 at 2:34 PM    Consent:  She and/or the health care decision maker is aware that that she may receive a bill for this telephone service, depending on her insurance coverage, and has provided verbal consent to proceed: Yes    Chief Complaint   Patient presents with    Weight Loss     Follow Up      History of Present Illness   Tyrese Ventura is a 36 y.o. female was evaluated by synchronous (real-time) audio-video technology from home, through the [employer] Patient Portal.    Weight management  Remote hx of gastric bypass surgery. Last electrolytes and vitamin levels WNL and still on supplements. Was never able to get the semaglutide but still interested in it. No family nor personal hx of thyroid cancer nor MEN. Review of Systems   Review of Systems   Constitutional:  Negative for chills, fever, malaise/fatigue and weight loss. Cardiovascular:  Negative for chest pain and palpitations. Neurological:  Negative for dizziness and headaches. Vitals/Objective:     General: alert, cooperative, no distress, morbidly obese   Mental  status: mental status: alert, oriented to person, place, and time, normal mood, behavior, speech, dress, motor activity, and thought processes   Resp: resp: normal effort and no respiratory distress   Neuro: neuro: no gross deficits   Skin: skin: no discoloration or lesions of concern on visible areas   Due to this being a TeleHealth evaluation, many elements of the physical examination are unable to be assessed. Assessment and Plan:     43yo F with hx of gastri bypass surgery with weight gain and current BMI of 55.71kg/m2 and has not been able to lose weight despite intermittent intensive walking and with exercise.     1. Obesity, morbid ([address]  - semaglutide, weight loss, 0.25 mg/0.5 mL pnij; 0.25 mg by SubCUTAneous route every seven (7) days. Indications: weight loss management for an obese person  Dispense: 4 Each; Refill: 0    2. S/P gastric bypass  - semaglutide, weight loss, 0.25 mg/0.5 mL pnij; 0.25 mg by SubCUTAneous route every seven (7) days. Indications: weight loss management for an obese person  Dispense: 4 Each; Refill: 0         We discussed the expected course, resolution and complications of the diagnosis(es) in detail. Medication risks, benefits, costs, interactions, and alternatives were discussed as indicated. I advised her to contact the office if her condition worsens, changes or fails to improve as anticipated. She expressed understanding with the diagnosis(es) and plan. Patient understands that this encounter was a temporary measure, and the importance of further follow up and examination was emphasized. Patient verbalized understanding. Patient informed to follow up: Follow-up and Dispositions    Return in about 4 months (around 5/13/2023) for followup for weight management . Electronically Signed: Renetta Bingham MD    Providers location when delivering service: clinic    Pursuant to the emergency declaration under the [facility], [facility] waiver authority and the [employer] and [employer] Act, this Virtual  Visit was conducted, with patient's consent, to reduce the patient's risk of exposure to COVID-19 and provide continuity of care for an established patient. Services were provided through a video synchronous discussion virtually to substitute for in-person clinic visit. History   Patients past medical, surgical and family histories were reviewed and updated.       Past Medical History:   Diagnosis Date    Anemia 2/25/2007    History of blood transfusion      Past Surgical History:   Procedure Laterality Date    HX GASTRIC BYPASS  1997     Family History   Problem Relation Age of Onset    Elevated Lipids Mother     Hypertension Mother     Thyroid Disease Mother     Diabetes Father     Hypertension Father     Elevated Lipids Father     Sleep Apnea Father     Mult Sclerosis Maternal Aunt     Hypertension Paternal Aunt     Elevated Lipids Paternal Aunt     Lung Cancer Maternal Grandfather     Diabetes Maternal Grandfather     Heart Disease Maternal Grandfather     Elevated Lipids Maternal Grandfather     Heart Disease Paternal Grandmother     Hypertension Paternal Grandmother     Elevated Lipids Paternal Grandmother     Elevated Lipids Paternal Grandfather     Hypertension Paternal Grandfather      Social History     Socioeconomic History    Marital status: SINGLE     Spouse name: Not on file    Number of children: Not on file    Years of education: Not on file    Highest education level: Not on file   Occupational History    Not on file   Tobacco Use    Smoking status: Never     Passive exposure: Never    Smokeless tobacco: Never   Vaping Use    Vaping Use: Never used   Substance and Sexual Activity    Alcohol use: Yes     Comment: occasional    Drug use: Never    Sexual activity: Not Currently   Other Topics Concern    Not on file   Social History Narrative    Not on file     Social Determinants of Health     Financial Resource Strain: Low Risk     Difficulty of Paying Living Expenses: Not hard at all   Food Insecurity: No Food Insecurity    Worried About Running Out of Food in the Last Year: Never true    Ran Out of Food in the Last Year: Never true   Transportation Needs: Not on file   Physical Activity: Not on file   Stress: Not on file   Social Connections: Not on file   Intimate Partner Violence: Not on file   Housing Stability: Not on file     Patient Active Problem List   Diagnosis Code    Obesity, morbid ([facility] E66.01    At risk for nutrition deficiency Z91.89    S/P gastric bypass Z98.84    Anemia D64.9    Vitamin D deficiency E55.9    Rosacea, acne L71.9    H/O mammogram Z92.89    Papanicolaou smear Z12.4          Current Medications/Allergies   Medications and Allergies reviewed:    Current Outpatient Medications   Medication Sig Dispense Refill    semaglutide, weight loss, 0.25 mg/0.5 mL pnij 0.25 mg by SubCUTAneous route every seven (7) days. Indications: weight loss management for an obese person 4 Each 0    cyanocobalamin (Vitamin B-12) 1,000 mcg tablet Take 2 Tablets by mouth daily. Take 2 tabs daily for 2 weeks then take 1 tab daily 180 Tablet 3    minocycline (MINOCIN, DYNACIN) 100 mg capsule TAKE ONE CAPSULE BY MOUTH DAILY WITH FOOD 60 Capsule 0    selenium sulfide 2.5 % lotion Apply 1 Bottle to affected area every Monday, Wednesday, Friday. 3 Each 1    acyclovir (ZOVIRAX) 400 mg tablet Take 1 Tablet by mouth daily as needed (cold sore). Indications: a herpes simplex infection 90 Tablet 1    norgestimate-ethinyl estradioL (ORTHO-CYCLEN, SPRINTEC) 0.25-35 mg-mcg tab Take 1 Tablet by mouth daily. cyclobenzaprine (FLEXERIL) 10 mg tablet Take 1 Tablet by mouth three (3) times daily as needed for Muscle Spasm(s). 90 Tablet 0    cholecalciferol (VITAMIN D3) (50,000 UNITS /1250 MCG) capsule Take 1 Capsule by mouth every seven (7) days. Appointment/labs due for further refills!  4 Capsule 0    ferrous sulfate 325 mg (65 mg iron) tablet iron      levocetirizine (XYZAL) 5 mg tablet       diphenhydrAMINE (BENADRYL) 25 mg capsule  (Patient not taking: No sig reported)      nystatin-triamcinolone (MYCOLOG) 100,000-0.1 unit/gram-% ointment nystatin-triamcinolone 100,000 unit/gram-0.1 % topical ointment       Allergies   Allergen Reactions    Ibuprofen Other (comments)    Nsaids (Non-Steroidal Anti-Inflammatory Drug) Other (comments)    Shrimp Swelling

## 2023-01-16 ENCOUNTER — PATIENT MESSAGE (OUTPATIENT)
Dept: FAMILY MEDICINE CLINIC | Age: 43
End: 2023-01-16

## 2023-03-19 DIAGNOSIS — Z86.19 H/O COLD SORES: ICD-10-CM

## 2023-03-20 RX ORDER — ACYCLOVIR 400 MG/1
400 TABLET ORAL
Qty: 90 TABLET | Refills: 1 | Status: SHIPPED | OUTPATIENT
Start: 2023-03-20

## 2023-05-19 ENCOUNTER — TELEPHONE (OUTPATIENT)
Age: 43
End: 2023-05-19

## 2023-05-24 SDOH — ECONOMIC STABILITY: HOUSING INSECURITY
IN THE LAST 12 MONTHS, WAS THERE A TIME WHEN YOU DID NOT HAVE A STEADY PLACE TO SLEEP OR SLEPT IN A SHELTER (INCLUDING NOW)?: NO

## 2023-05-24 SDOH — ECONOMIC STABILITY: FOOD INSECURITY: WITHIN THE PAST 12 MONTHS, THE FOOD YOU BOUGHT JUST DIDN'T LAST AND YOU DIDN'T HAVE MONEY TO GET MORE.: NEVER TRUE

## 2023-05-24 SDOH — ECONOMIC STABILITY: FOOD INSECURITY: WITHIN THE PAST 12 MONTHS, YOU WORRIED THAT YOUR FOOD WOULD RUN OUT BEFORE YOU GOT MONEY TO BUY MORE.: NEVER TRUE

## 2023-05-24 SDOH — ECONOMIC STABILITY: TRANSPORTATION INSECURITY
IN THE PAST 12 MONTHS, HAS LACK OF TRANSPORTATION KEPT YOU FROM MEETINGS, WORK, OR FROM GETTING THINGS NEEDED FOR DAILY LIVING?: NO

## 2023-05-24 SDOH — ECONOMIC STABILITY: INCOME INSECURITY: HOW HARD IS IT FOR YOU TO PAY FOR THE VERY BASICS LIKE FOOD, HOUSING, MEDICAL CARE, AND HEATING?: NOT VERY HARD

## 2023-05-25 ENCOUNTER — TELEMEDICINE (OUTPATIENT)
Age: 43
End: 2023-05-25
Payer: COMMERCIAL

## 2023-05-25 DIAGNOSIS — E55.9 VITAMIN D DEFICIENCY: Primary | ICD-10-CM

## 2023-05-25 DIAGNOSIS — Z98.84 S/P GASTRIC BYPASS: ICD-10-CM

## 2023-05-25 DIAGNOSIS — E66.01 OBESITY, MORBID (HCC): ICD-10-CM

## 2023-05-25 PROCEDURE — 99214 OFFICE O/P EST MOD 30 MIN: CPT | Performed by: STUDENT IN AN ORGANIZED HEALTH CARE EDUCATION/TRAINING PROGRAM

## 2023-05-25 NOTE — PROGRESS NOTES
capsule by mouth every 7 days  Dispense: 12 capsule; Refill: 3  - cyanocobalamin 1000 MCG tablet; Take 2 tablets by mouth daily  Dispense: 90 tablet; Refill: 3  - Vitamin D 25 Hydroxy; Future    2. S/P gastric bypass  - cyanocobalamin 1000 MCG tablet; Take 2 tablets by mouth daily  Dispense: 90 tablet; Refill: 3  - Vitamin B12 & Folate; Future  - CBC; Future    3. Obesity, morbid ([address]  - Lipid Panel; Future  - Hemoglobin A1C; Future  - Comprehensive Metabolic Panel; Future  - TSH; Future  - CBC; Future         We discussed the expected course, resolution and complications of the diagnosis(es) in detail. Medication risks, benefits, costs, interactions, and alternatives were discussed as indicated. I advised her to contact the office if her condition worsens, changes or fails to improve as anticipated. She expressed understanding with the diagnosis(es) and plan. Patient understands that this encounter was a temporary measure, and the importance of further follow up and examination was emphasized. Patient verbalized understanding. Patient informed to follow up:   Return for CPE + lab visit 1 week before. Electronically Signed: Selena Ramirez MD    Providers location when delivering service: clinic     Pursuant to the emergency declaration under the [facility], [facility] waiver authority and the [employer] and [employer] Act, this Virtual  Visit was conducted, with patient's consent, to reduce the patient's risk of exposure to COVID-19 and provide continuity of care for an established patient. Services were provided through a video synchronous discussion virtually to substitute for in-person clinic visit. History   Patients past medical, surgical and family histories were reviewed and updated.       Past Medical History:   Diagnosis Date    Anemia 2/25/2007    History of blood transfusion      Past Surgical History:   Procedure

## 2023-06-23 ENCOUNTER — TELEPHONE (OUTPATIENT)
Age: 43
End: 2023-06-23

## 2023-06-23 NOTE — TELEPHONE ENCOUNTER
----- Message from Sully Kendrick sent at 6/23/2023 10:53 AM EDT -----  Subject: Appointment Request    Reason for Call: Established Patient Appointment needed: Routine Existing   Condition Follow Up    QUESTIONS    Reason for appointment request? No appointments available during search     Additional Information for Provider? Dr. Ila Moy wanted PT to have an in   person 2 month f/u on meds and to have labs done in July. All appts i had   were VV.  Please call PT to schedule in office visit.   ---------------------------------------------------------------------------  --------------  Cheyenne SYLVESTER  [phone]; OK to leave message on voicemail  ---------------------------------------------------------------------------  --------------  SCRIPT ANSWERS

## 2023-06-23 NOTE — TELEPHONE ENCOUNTER
Pt's scheduled to see Rizwana Spring on 8.31.23 @ 1:40 PM with  as well as coming in on 8.23.23 @ 8:15 AM for her fasting labs.

## 2023-08-10 DIAGNOSIS — E55.9 VITAMIN D DEFICIENCY: ICD-10-CM

## 2023-08-21 DIAGNOSIS — Z30.41 ENCOUNTER FOR SURVEILLANCE OF CONTRACEPTIVE PILLS: ICD-10-CM

## 2023-08-21 RX ORDER — NORGESTIMATE AND ETHINYL ESTRADIOL 0.25-0.035
KIT ORAL
Qty: 84 TABLET | Refills: 4 | OUTPATIENT
Start: 2023-08-21

## 2023-08-21 NOTE — TELEPHONE ENCOUNTER
PCP: Jaye Valentino MD    Last appt: [unfilled]  Future Appointments   Date Time Provider [address]   8/23/2023  8:15 AM LAB ONLY EDIS TAMAYO   8/31/2023  3:40 PM MD EDIS King       Requested Prescriptions     Pending Prescriptions Disp Refills    norgestimate-ethinyl estradiol (ORTHO-CYCLEN) 0.25-35 MG-MCG per tablet [Pharmacy Med Name: Andi Maciastle 0.25-0.035 MG] 84 tablet 4     Sig: TAKE 1 TABLET BY MOUTH EVERY DAY

## 2023-08-23 ENCOUNTER — NURSE ONLY (OUTPATIENT)
Age: 43
End: 2023-08-23

## 2023-08-23 DIAGNOSIS — Z98.84 S/P GASTRIC BYPASS: ICD-10-CM

## 2023-08-23 DIAGNOSIS — E66.01 OBESITY, MORBID (HCC): ICD-10-CM

## 2023-08-23 DIAGNOSIS — E55.9 VITAMIN D DEFICIENCY: ICD-10-CM

## 2023-08-24 LAB
25(OH)D3+25(OH)D2 SERPL-MCNC: 67 NG/ML (ref 30–100)
ALBUMIN SERPL-MCNC: 3.9 G/DL (ref 3.9–4.9)
ALBUMIN/GLOB SERPL: 1.2 {RATIO} (ref 1.2–2.2)
ALP SERPL-CCNC: 88 IU/L (ref 44–121)
ALT SERPL-CCNC: 16 IU/L (ref 0–32)
AST SERPL-CCNC: 20 IU/L (ref 0–40)
BILIRUB SERPL-MCNC: 0.4 MG/DL (ref 0–1.2)
BUN SERPL-MCNC: 9 MG/DL (ref 6–24)
BUN/CREAT SERPL: 15 (ref 9–23)
CALCIUM SERPL-MCNC: 9 MG/DL (ref 8.7–10.2)
CHLORIDE SERPL-SCNC: 101 MMOL/L (ref 96–106)
CHOLEST SERPL-MCNC: 181 MG/DL (ref 100–199)
CO2 SERPL-SCNC: 21 MMOL/L (ref 20–29)
CREAT SERPL-MCNC: 0.59 MG/DL (ref 0.57–1)
EGFRCR SERPLBLD CKD-EPI 2021: 114 ML/MIN/1.73
ERYTHROCYTE [DISTWIDTH] IN BLOOD BY AUTOMATED COUNT: 13 % (ref 11.7–15.4)
FOLATE SERPL-MCNC: 15.6 NG/ML
GLOBULIN SER CALC-MCNC: 3.2 G/DL (ref 1.5–4.5)
GLUCOSE SERPL-MCNC: 87 MG/DL (ref 70–99)
HBA1C MFR BLD: 5.5 % (ref 4.8–5.6)
HCT VFR BLD AUTO: 44.6 % (ref 34–46.6)
HDLC SERPL-MCNC: 40 MG/DL
HGB BLD-MCNC: 14.8 G/DL (ref 11.1–15.9)
IMP & REVIEW OF LAB RESULTS: NORMAL
LDLC SERPL CALC-MCNC: 119 MG/DL (ref 0–99)
MCH RBC QN AUTO: 29 PG (ref 26.6–33)
MCHC RBC AUTO-ENTMCNC: 33.2 G/DL (ref 31.5–35.7)
MCV RBC AUTO: 88 FL (ref 79–97)
PLATELET # BLD AUTO: 215 X10E3/UL (ref 150–450)
POTASSIUM SERPL-SCNC: 4.6 MMOL/L (ref 3.5–5.2)
PROT SERPL-MCNC: 7.1 G/DL (ref 6–8.5)
RBC # BLD AUTO: 5.1 X10E6/UL (ref 3.77–5.28)
SODIUM SERPL-SCNC: 138 MMOL/L (ref 134–144)
TRIGL SERPL-MCNC: 121 MG/DL (ref 0–149)
TSH SERPL DL<=0.005 MIU/L-ACNC: 2.01 UIU/ML (ref 0.45–4.5)
VIT B12 SERPL-MCNC: 372 PG/ML (ref 232–1245)
VLDLC SERPL CALC-MCNC: 22 MG/DL (ref 5–40)
WBC # BLD AUTO: 8.2 X10E3/UL (ref 3.4–10.8)

## 2023-08-31 ENCOUNTER — OFFICE VISIT (OUTPATIENT)
Age: 43
End: 2023-08-31
Payer: COMMERCIAL

## 2023-08-31 VITALS
SYSTOLIC BLOOD PRESSURE: 116 MMHG | WEIGHT: 293 LBS | RESPIRATION RATE: 18 BRPM | TEMPERATURE: 98.1 F | OXYGEN SATURATION: 97 % | HEART RATE: 74 BPM | HEIGHT: 65 IN | DIASTOLIC BLOOD PRESSURE: 82 MMHG | BODY MASS INDEX: 48.82 KG/M2

## 2023-08-31 DIAGNOSIS — E66.01 CLASS 3 SEVERE OBESITY DUE TO EXCESS CALORIES WITHOUT SERIOUS COMORBIDITY WITH BODY MASS INDEX (BMI) OF 50.0 TO 59.9 IN ADULT (HCC): ICD-10-CM

## 2023-08-31 DIAGNOSIS — Z00.00 WELL WOMAN EXAM WITHOUT GYNECOLOGICAL EXAM: Primary | ICD-10-CM

## 2023-08-31 DIAGNOSIS — R06.83 SNORING: ICD-10-CM

## 2023-08-31 PROBLEM — E66.813 CLASS 3 SEVERE OBESITY DUE TO EXCESS CALORIES WITHOUT SERIOUS COMORBIDITY WITH BODY MASS INDEX (BMI) OF 50.0 TO 59.9 IN ADULT: Status: ACTIVE | Noted: 2020-10-25

## 2023-08-31 PROCEDURE — 99396 PREV VISIT EST AGE 40-64: CPT | Performed by: STUDENT IN AN ORGANIZED HEALTH CARE EDUCATION/TRAINING PROGRAM

## 2023-08-31 PROCEDURE — 99214 OFFICE O/P EST MOD 30 MIN: CPT | Performed by: STUDENT IN AN ORGANIZED HEALTH CARE EDUCATION/TRAINING PROGRAM

## 2023-08-31 SDOH — ECONOMIC STABILITY: FOOD INSECURITY: WITHIN THE PAST 12 MONTHS, THE FOOD YOU BOUGHT JUST DIDN'T LAST AND YOU DIDN'T HAVE MONEY TO GET MORE.: NEVER TRUE

## 2023-08-31 SDOH — ECONOMIC STABILITY: FOOD INSECURITY: WITHIN THE PAST 12 MONTHS, YOU WORRIED THAT YOUR FOOD WOULD RUN OUT BEFORE YOU GOT MONEY TO BUY MORE.: NEVER TRUE

## 2023-08-31 SDOH — ECONOMIC STABILITY: INCOME INSECURITY: HOW HARD IS IT FOR YOU TO PAY FOR THE VERY BASICS LIKE FOOD, HOUSING, MEDICAL CARE, AND HEATING?: NOT HARD AT ALL

## 2023-08-31 ASSESSMENT — PATIENT HEALTH QUESTIONNAIRE - PHQ9
SUM OF ALL RESPONSES TO PHQ QUESTIONS 1-9: 0
SUM OF ALL RESPONSES TO PHQ QUESTIONS 1-9: 0
SUM OF ALL RESPONSES TO PHQ9 QUESTIONS 1 & 2: 0
1. LITTLE INTEREST OR PLEASURE IN DOING THINGS: 0
2. FEELING DOWN, DEPRESSED OR HOPELESS: 0
SUM OF ALL RESPONSES TO PHQ QUESTIONS 1-9: 0
SUM OF ALL RESPONSES TO PHQ QUESTIONS 1-9: 0

## 2023-10-09 ENCOUNTER — TELEMEDICINE (OUTPATIENT)
Age: 43
End: 2023-10-09
Payer: COMMERCIAL

## 2023-10-09 DIAGNOSIS — B96.89 ACUTE BACTERIAL SINUSITIS: Primary | ICD-10-CM

## 2023-10-09 DIAGNOSIS — J01.90 ACUTE BACTERIAL SINUSITIS: Primary | ICD-10-CM

## 2023-10-09 PROCEDURE — 99214 OFFICE O/P EST MOD 30 MIN: CPT | Performed by: STUDENT IN AN ORGANIZED HEALTH CARE EDUCATION/TRAINING PROGRAM

## 2023-10-09 RX ORDER — AMOXICILLIN AND CLAVULANATE POTASSIUM 875; 125 MG/1; MG/1
1 TABLET, FILM COATED ORAL 2 TIMES DAILY
Qty: 10 TABLET | Refills: 0 | Status: SHIPPED | OUTPATIENT
Start: 2023-10-09 | End: 2023-10-14

## 2023-10-09 NOTE — PROGRESS NOTES
Carol Blanco  39 y.o. female  1980  [address] [address] [address] [facility]  Telemedicine Progress Note  Norman Rodriguez MD       Encounter Date and Time: October 9, 2023 at 9:21 AM    Consent:  She and/or the health care decision maker is aware that that she may receive a bill for this telephone service, depending on her insurance coverage, and has provided verbal consent to proceed: YES    Chief Complaint   Patient presents with    Chest Congestion     History of Present Illness   Gladis Davis is a 39 y.o. female was evaluated by synchronous (real-time) audio-video technology from home, through the [employer] Patient Portal.    Cough/congestion  Started with  feeling fatigue 4 days ago - started with sore throat, significant congestion and runny nose and deep cough with pain in her chest with cough. Having HA and ear ache on the right side. No fever, no chills - using mucinex and nyquil to try to get sleep. At [address] test negative x3      Review of Systems   Negative except what is mentioned in HPI    Vitals/Objective:     General: alert, cooperative, fatigued, no distress, and appears unwell, nontoxic, +coughing   Mental  status: mental status: alert, oriented to person, place, and time, normal mood, behavior, speech, dress, motor activity, and thought processes   Resp: No increased WOB, speaking easily in complete sentences, cough- coarse, persistent   Neuro: No focal deficits    Skin: skin: no discoloration or lesions of concern on visible areas   Due to this being a TeleHealth evaluation, many elements of the physical examination are unable to be assessed. Assessment and Plan:     38yo F with hx of obesity s/p gastric bypass being seen today for 4 days of cough and congestion with right sided ear pain and HA concerning for acute sinusitis in setting of viral cough.     1. Acute bacterial sinusitis  - amoxicillin-clavulanate (AUGMENTIN) 875-125 MG per

## 2023-10-13 DIAGNOSIS — J40 BRONCHITIS: Primary | ICD-10-CM

## 2023-10-13 RX ORDER — PREDNISONE 20 MG/1
20 TABLET ORAL
Qty: 5 TABLET | Refills: 0 | Status: SHIPPED | OUTPATIENT
Start: 2023-10-13 | End: 2023-10-18

## 2023-10-15 DIAGNOSIS — Z86.19 PERSONAL HISTORY OF OTHER INFECTIOUS AND PARASITIC DISEASES: ICD-10-CM

## 2023-10-17 RX ORDER — ACYCLOVIR 400 MG/1
TABLET ORAL
Qty: 90 TABLET | Refills: 1 | Status: SHIPPED | OUTPATIENT
Start: 2023-10-17

## 2023-10-17 NOTE — TELEPHONE ENCOUNTER
PCP: La Wooten MD    Last appt: 10/9/2023     No future appointments. Requested Prescriptions     Pending Prescriptions Disp Refills    acyclovir (ZOVIRAX) 400 MG tablet [Pharmacy Med Name: ACYCLOVIR 400 MG TABLET] 90 tablet 1     Sig: TAKE 1 TABLET BY MOUTH DAILY AS NEEDED (COLD SORE).  INDICATIONS: A HERPES SIMPLEX INFECTION       Prior labs and Blood pressures:  BP Readings from Last 3 Encounters:   08/31/23 116/82   10/13/22 112/78   07/13/22 106/66     Lab Results   Component Value Date/Time     08/23/2023 12:00 AM    K 4.6 08/23/2023 12:00 AM     08/23/2023 12:00 AM    CO2 21 08/23/2023 12:00 AM    BUN 9 08/23/2023 12:00 AM    GFRAA 134 11/06/2020 12:00 AM     No results found for: \"HBA1C\", \"[id number]\"  Lab Results   Component Value Date/Time    CHOL 181 08/23/2023 12:00 AM    CHOL 169 07/13/2022 12:00 AM    HDL 40 08/23/2023 12:00 AM    VLDL 20 07/13/2022 12:00 AM     No results found for: \"VITD3\", \"VD3RIA\"    Lab Results   Component Value Date/Time    TSH 2.010 08/23/2023 12:00 AM

## 2024-02-01 DIAGNOSIS — E55.9 VITAMIN D DEFICIENCY: ICD-10-CM

## 2024-02-02 NOTE — TELEPHONE ENCOUNTER
PCP: Ashley Harman MD    Last appt: 10/9/2023     No future appointments.    Requested Prescriptions     Pending Prescriptions Disp Refills    vitamin D (CHOLECALCIFEROL) 62810 UNIT CAPS 12 capsule 3     Sig: Take 1 capsule by mouth every 7 days       Prior labs and Blood pressures:  BP Readings from Last 3 Encounters:   08/31/23 116/82   10/13/22 112/78   07/13/22 106/66     Lab Results   Component Value Date/Time     08/23/2023 12:00 AM    K 4.6 08/23/2023 12:00 AM     08/23/2023 12:00 AM    CO2 21 08/23/2023 12:00 AM    BUN 9 08/23/2023 12:00 AM    GFRAA 134 11/06/2020 12:00 AM     No results found for: \"HBA1C\", \"HFO1INHT\"  Lab Results   Component Value Date/Time    CHOL 181 08/23/2023 12:00 AM    CHOL 169 07/13/2022 12:00 AM    HDL 40 08/23/2023 12:00 AM    VLDL 20 07/13/2022 12:00 AM     No results found for: \"VITD3\", \"VD3RIA\"    Lab Results   Component Value Date/Time    TSH 2.010 08/23/2023 12:00 AM

## 2024-02-02 NOTE — TELEPHONE ENCOUNTER
Left message to callback the office on 2/2/24 when pt calls back please let  her know that  approved her Vitamin D,however the pt will need to establish care with another PCP being that  is transferring.

## 2024-04-08 DIAGNOSIS — J40 BRONCHITIS: ICD-10-CM

## 2024-04-08 RX ORDER — PREDNISONE 20 MG/1
20 TABLET ORAL
Qty: 5 TABLET | Refills: 0 | OUTPATIENT
Start: 2024-04-08 | End: 2024-04-13

## 2024-04-19 DIAGNOSIS — E55.9 VITAMIN D DEFICIENCY: ICD-10-CM

## 2024-04-19 DIAGNOSIS — Z98.84 S/P GASTRIC BYPASS: ICD-10-CM

## 2024-04-22 NOTE — TELEPHONE ENCOUNTER
Looks like patient is scheduled w/Kimani at [facility] 4/25/24.  But also has NEW Patient appt scheduled w/Annamaria on 7/1/24.  Thanks, Ava    Last appointment: [facility] 10/9/23 Kimani  Next appointment: 4/25/24 Kimani, 7/1/24 Annamaria  Previous refill encounter(s): 5/25/23 90 + 3     Requested Prescriptions     Pending Prescriptions Disp Refills    cyanocobalamin 1000 MCG tablet 180 tablet 0     Sig: Take 2 tablets by mouth daily     For Pharmacy Admin Tracking Only    Program: Medication Refill  CPA in place:    Recommendation Provided To:   Intervention Detail: New Rx: 1, reason: Patient Preference  Intervention Accepted By:   Gap Closed?:    Time Spent (min): 5

## 2024-04-25 ENCOUNTER — OFFICE VISIT (OUTPATIENT)
Age: 44
End: 2024-04-25
Payer: COMMERCIAL

## 2024-04-25 VITALS
OXYGEN SATURATION: 96 % | SYSTOLIC BLOOD PRESSURE: 130 MMHG | BODY MASS INDEX: 48.82 KG/M2 | HEIGHT: 65 IN | WEIGHT: 293 LBS | HEART RATE: 77 BPM | RESPIRATION RATE: 20 BRPM | DIASTOLIC BLOOD PRESSURE: 81 MMHG | TEMPERATURE: 97.5 F

## 2024-04-25 DIAGNOSIS — E66.01 CLASS 3 SEVERE OBESITY DUE TO EXCESS CALORIES WITHOUT SERIOUS COMORBIDITY WITH BODY MASS INDEX (BMI) OF 50.0 TO 59.9 IN ADULT (HCC): Primary | ICD-10-CM

## 2024-04-25 DIAGNOSIS — B37.2 CANDIDAL INTERTRIGO: ICD-10-CM

## 2024-04-25 PROCEDURE — 99214 OFFICE O/P EST MOD 30 MIN: CPT | Performed by: STUDENT IN AN ORGANIZED HEALTH CARE EDUCATION/TRAINING PROGRAM

## 2024-04-25 RX ORDER — IVERMECTIN 3 MG/1
TABLET ORAL
COMMUNITY
Start: 2024-04-12

## 2024-04-25 RX ORDER — NYSTATIN AND TRIAMCINOLONE ACETONIDE 100000; 1 [USP'U]/G; MG/G
OINTMENT TOPICAL
Qty: 60 G | Refills: 1 | Status: SHIPPED | OUTPATIENT
Start: 2024-04-25

## 2024-04-25 RX ORDER — BUPROPION HYDROCHLORIDE 300 MG/1
300 TABLET ORAL EVERY MORNING
COMMUNITY

## 2024-04-25 ASSESSMENT — PATIENT HEALTH QUESTIONNAIRE - PHQ9
2. FEELING DOWN, DEPRESSED OR HOPELESS: NOT AT ALL
SUM OF ALL RESPONSES TO PHQ QUESTIONS 1-9: 0
SUM OF ALL RESPONSES TO PHQ QUESTIONS 1-9: 0
SUM OF ALL RESPONSES TO PHQ9 QUESTIONS 1 & 2: 0
SUM OF ALL RESPONSES TO PHQ QUESTIONS 1-9: 0
SUM OF ALL RESPONSES TO PHQ QUESTIONS 1-9: 0
1. LITTLE INTEREST OR PLEASURE IN DOING THINGS: NOT AT ALL

## 2024-04-25 NOTE — PROGRESS NOTES
Shaniqua López  44 y.o. female  1980  [address]  [place] [address]  [phone]     [facility]       Chief Complaint:  Chief Complaint   Patient presents with    Weight Management   Source: self, the medical record     Subjective  Shaniqua López is an 44 y.o. female who presents for weight management. Since was last seen worked with weight and wellness person with [facility] - started on wellbutrin initially at 150mg then increased to 300mg about 3 months ago. Feels like it has helped to curb appetite. Does get recurrent intertrigo especially in spring and summer beneath skin folds      ROS  Negative except what is mentioned in HPI      Allergies - reviewed:   Allergies   Allergen Reactions    Ibuprofen Other (See Comments)     S/p gastric bypass    Nsaids Other (See Comments)     S/p gastric bypass    Shrimp Extract Swelling         Medications - reviewed:   Current Outpatient Medications   Medication Sig    ivermectin 3 MG tablet TAKE 6 TABLETS BY MOUTH ONCE WEEKLY    nystatin-triamcinolone (MYCOLOG) 102048-2.1 UNIT/GM-% ointment nystatin-triamcinolone 100,000 unit/gram-0.1 % topical ointment    buPROPion (WELLBUTRIN XL) 300 MG extended release tablet Take 1 tablet by mouth every morning    cyanocobalamin 1000 MCG tablet Take 2 tablets by mouth daily    vitamin D (CHOLECALCIFEROL) 39468 UNIT CAPS Take 1 capsule by mouth every 7 days    acyclovir (ZOVIRAX) 400 MG tablet TAKE 1 TABLET BY MOUTH DAILY AS NEEDED (COLD SORE). INDICATIONS: A HERPES SIMPLEX INFECTION    Multiple Vitamin (MVI, CELEBRATE, CHEWABLE TABLET) multivitamin tablet   Prescribed by non [facility] MD    cyclobenzaprine (FLEXERIL) 10 MG tablet Take 1 tablet by mouth 3 times daily as needed    diphenhydrAMINE (BENADRYL) 25 MG capsule ceived the following from [facility]: Outside name: diphenhydrAMINE (BENADRYL) 25 mg capsule    ferrous sulfate (IRON 325) 325 (65 Fe) MG tablet iron    levocetirizine

## 2024-06-04 ENCOUNTER — PATIENT MESSAGE (OUTPATIENT)
Age: 44
End: 2024-06-04

## 2024-06-04 DIAGNOSIS — M62.838 MUSCLE SPASM OF BOTH LOWER LEGS: Primary | ICD-10-CM

## 2024-06-05 RX ORDER — CYCLOBENZAPRINE HCL 10 MG
10 TABLET ORAL 3 TIMES DAILY PRN
Qty: 30 TABLET | Refills: 0 | Status: SHIPPED | OUTPATIENT
Start: 2024-06-05

## 2024-06-05 NOTE — TELEPHONE ENCOUNTER
From: Shaniqua López  To: Dr. Ashley Harman  Sent: 6/4/2024 7:15 PM EDT  Subject: Refill    Could I get a refill of the cyclobenzaprine 10 MG tablet? My back is having a rough week with really bad sciatic nerve pain all down my leg. Thanks!

## 2024-06-11 ENCOUNTER — TELEMEDICINE (OUTPATIENT)
Age: 44
End: 2024-06-11
Payer: COMMERCIAL

## 2024-06-11 DIAGNOSIS — M54.41 ACUTE BILATERAL LOW BACK PAIN WITH RIGHT-SIDED SCIATICA: Primary | ICD-10-CM

## 2024-06-11 PROCEDURE — 99214 OFFICE O/P EST MOD 30 MIN: CPT | Performed by: STUDENT IN AN ORGANIZED HEALTH CARE EDUCATION/TRAINING PROGRAM

## 2024-06-11 RX ORDER — ERGOCALCIFEROL 1.25 MG/1
50000 CAPSULE ORAL WEEKLY
COMMUNITY
Start: 2024-05-16 | End: 2024-06-11 | Stop reason: SDUPTHER

## 2024-06-11 RX ORDER — METHYLPREDNISOLONE 4 MG/1
TABLET ORAL
Qty: 1 KIT | Refills: 0 | Status: SHIPPED | OUTPATIENT
Start: 2024-06-11 | End: 2024-06-17

## 2024-06-11 NOTE — PROGRESS NOTES
Chief Complaint   Patient presents with    Back Pain     Started 1 week ago and has gotten worst     Pain 5  
Transportation (Non-Medical): No   Physical Activity: Not on file   Stress: Not on file   Social Connections: Not on file   Intimate Partner Violence: Not on file   Housing Stability: Unknown (8/31/2023)    Housing Stability Vital Sign     Unable to Pay for Housing in the Last Year: Not on file     Number of Places Lived in the Last Year: Not on file     Unstable Housing in the Last Year: No     Patient Active Problem List   Diagnosis    At risk for nutrition deficiency    S/P gastric bypass    Class 3 severe obesity due to excess calories without serious comorbidity with body mass index (BMI) of 50.0 to 59.9 in adult (HCC)    Anemia    Vitamin D deficiency    Rosacea, acne    H/O mammogram    Snoring          Current Medications/Allergies   Medications and Allergies reviewed:    Current Outpatient Medications   Medication Sig Dispense Refill    methylPREDNISolone (MEDROL DOSEPACK) 4 MG tablet Take by mouth as indicated on dose pack 1 kit 0    cyclobenzaprine (FLEXERIL) 10 MG tablet Take 1 tablet by mouth 3 times daily as needed for Muscle spasms 30 tablet 0    ivermectin 3 MG tablet TAKE 6 TABLETS BY MOUTH ONCE WEEKLY      nystatin-triamcinolone (MYCOLOG) 760416-0.1 UNIT/GM-% ointment nystatin-triamcinolone 100,000 unit/gram-0.1 % topical ointment (Patient taking differently: Apply 2 g topically daily nystatin-triamcinolone 100,000 unit/gram-0.1 % topical ointment) 60 g 1    buPROPion (WELLBUTRIN XL) 300 MG extended release tablet Take 1 tablet by mouth every morning      cyanocobalamin 1000 MCG tablet Take 2 tablets by mouth daily 180 tablet 0    vitamin D (CHOLECALCIFEROL) 95024 UNIT CAPS Take 1 capsule by mouth every 7 days 12 capsule 3    acyclovir (ZOVIRAX) 400 MG tablet TAKE 1 TABLET BY MOUTH DAILY AS NEEDED (COLD SORE). INDICATIONS: A HERPES SIMPLEX INFECTION 90 tablet 1    Multiple Vitamin (MVI, CELEBRATE, CHEWABLE TABLET) multivitamin tablet   Prescribed by non [facility] MD      ferrous sulfate (IRON 325) 325

## 2024-06-18 ENCOUNTER — TELEPHONE (OUTPATIENT)
Age: 44
End: 2024-06-18

## 2024-06-18 NOTE — TELEPHONE ENCOUNTER
Pt Aunt ( Carri Sandoval ) calling   - states she is a doctor (PSR did not see her on HIPAA)     Wants patient to get a prior auth for Wagovy and bariatric referral   States she has P/A written up and would like to share it with you   And that bowen does not advocate for herself     Says the patient is with her and knows she is calling     Pls advise   Best number to reach patient is [phone]

## 2024-06-19 NOTE — TELEPHONE ENCOUNTER
Spoke with patient advised to schedule virtual appointment with Dr Harman to discuss options. Patient states that insurance say Wegovy  is not a covered medication.

## 2024-07-08 DIAGNOSIS — Z86.19 PERSONAL HISTORY OF OTHER INFECTIOUS AND PARASITIC DISEASES: ICD-10-CM

## 2024-07-10 RX ORDER — ACYCLOVIR 400 MG/1
TABLET ORAL
Qty: 90 TABLET | Refills: 1 | Status: SHIPPED | OUTPATIENT
Start: 2024-07-10

## 2024-07-24 DIAGNOSIS — E55.9 VITAMIN D DEFICIENCY: ICD-10-CM

## 2024-07-24 DIAGNOSIS — Z98.84 S/P GASTRIC BYPASS: ICD-10-CM

## 2024-07-25 RX ORDER — OMEGA-3/DHA/EPA/FISH OIL 35-113.5MG
2000 TABLET,CHEWABLE ORAL DAILY
Qty: 180 TABLET | Refills: 1 | Status: SHIPPED | OUTPATIENT
Start: 2024-07-25

## 2024-07-25 NOTE — TELEPHONE ENCOUNTER
Patient LOV: 4/25/2024 with Dr. Harman  Next OV: 8/29/2024 with Dr. Harman  Please send to pharmacy on file.

## 2024-08-22 RX ORDER — BUPROPION HYDROCHLORIDE 300 MG/1
300 TABLET ORAL EVERY MORNING
Qty: 30 TABLET | Refills: 0 | Status: SHIPPED | OUTPATIENT
Start: 2024-08-22

## 2024-08-22 NOTE — TELEPHONE ENCOUNTER
Message from patient.  Could you please send a refill of welbutrin 300mg to my pharmacy? I only have a couple days left. If I need to ween off of it let me know.   Bupropion hcl xl 300  mg tablet  Generic for Wellbutrin XL

## 2024-08-29 ENCOUNTER — OFFICE VISIT (OUTPATIENT)
Age: 44
End: 2024-08-29

## 2024-08-29 VITALS
TEMPERATURE: 97.1 F | DIASTOLIC BLOOD PRESSURE: 66 MMHG | HEIGHT: 65 IN | WEIGHT: 293 LBS | OXYGEN SATURATION: 98 % | RESPIRATION RATE: 16 BRPM | SYSTOLIC BLOOD PRESSURE: 125 MMHG | HEART RATE: 79 BPM | BODY MASS INDEX: 48.82 KG/M2

## 2024-08-29 DIAGNOSIS — Z98.84 S/P GASTRIC BYPASS: ICD-10-CM

## 2024-08-29 DIAGNOSIS — Z00.00 WELLNESS EXAMINATION: Primary | ICD-10-CM

## 2024-08-29 DIAGNOSIS — E66.01 CLASS 3 SEVERE OBESITY DUE TO EXCESS CALORIES WITHOUT SERIOUS COMORBIDITY WITH BODY MASS INDEX (BMI) OF 50.0 TO 59.9 IN ADULT (HCC): ICD-10-CM

## 2024-08-29 DIAGNOSIS — Z78.9 UNKNOWN VARICELLA VACCINATION STATUS: ICD-10-CM

## 2024-08-29 DIAGNOSIS — Z00.00 WELLNESS EXAMINATION: ICD-10-CM

## 2024-08-29 DIAGNOSIS — F33.0 MAJOR DEPRESSIVE DISORDER, RECURRENT, MILD (HCC): ICD-10-CM

## 2024-08-29 RX ORDER — ALBUTEROL SULFATE 90 UG/1
2 AEROSOL, METERED RESPIRATORY (INHALATION) EVERY 4 HOURS PRN
COMMUNITY

## 2024-08-29 RX ORDER — BUPROPION HYDROCHLORIDE 300 MG/1
300 TABLET ORAL EVERY MORNING
Qty: 90 TABLET | Refills: 3 | Status: SHIPPED | OUTPATIENT
Start: 2024-08-29

## 2024-08-29 RX ORDER — SEMAGLUTIDE 0.25 MG/.5ML
0.25 INJECTION, SOLUTION SUBCUTANEOUS
COMMUNITY
End: 2024-08-29

## 2024-08-29 NOTE — PATIENT INSTRUCTIONS
Dr Jurgen Vegas MD  [address], [place] [address]   [facility]     Brittani Francois with [facility]    You can call either office below to schedule a screening colonoscopy:    [facility]  [facility]  [address], [address]  [place] [address] [phone]      [facility]   [address] [address] [phone]

## 2024-08-29 NOTE — PROGRESS NOTES
Shaniqua López  44 y.o. female  1980  [address]  [place] [address]  [id number]     [facility]       Chief Complaint:   Chief Complaint   Patient presents with    Annual Exam     Physical   Source: self, the medical record     Subjective:   Shaniqua López is an 44 y.o. female who presents for complete physical exam.    Depression: taking wellbutrin 300mg, mood is stable     Hx gastric bypass: taking Vit D, B12, MVI    Sees derm every year for rosacea and skin check      Allergies - reviewed:   Allergies   Allergen Reactions    Ibuprofen Other (See Comments)     S/p gastric bypass    Nsaids Other (See Comments)     S/p gastric bypass    Shrimp Extract Swelling       Medications - reviewed:  Current Outpatient Medications   Medication Sig    albuterol sulfate HFA (PROVENTIL;VENTOLIN;PROAIR) 108 (90 Base) MCG/ACT inhaler Inhale 2 puffs into the lungs every 4 hours as needed for Wheezing    buPROPion (WELLBUTRIN XL) 300 MG extended release tablet Take 1 tablet by mouth every morning    [employer] VITAMIN B12 1000 MCG tablet TAKE 2 TABLETS BY MOUTH EVERY DAY    acyclovir (ZOVIRAX) 400 MG tablet TAKE 1 TABLET BY MOUTH DAILY AS NEEDED (COLD SORE). INDICATIONS: A HERPES SIMPLEX INFECTION    cyclobenzaprine (FLEXERIL) 10 MG tablet Take 1 tablet by mouth 3 times daily as needed for Muscle spasms    nystatin-triamcinolone (MYCOLOG) 144495-0.1 UNIT/GM-% ointment nystatin-triamcinolone 100,000 unit/gram-0.1 % topical ointment (Patient taking differently: Apply 2 g topically daily as needed nystatin-triamcinolone 100,000 unit/gram-0.1 % topical ointment)    vitamin D (CHOLECALCIFEROL) 93619 UNIT CAPS Take 1 capsule by mouth every 7 days    Multiple Vitamin (MVI, CELEBRATE, CHEWABLE TABLET) multivitamin tablet   Prescribed by non [facility] MD    ferrous sulfate (IRON 325) 325 (65 Fe) MG tablet Take 1 tablet by mouth daily (with breakfast)    levocetirizine (XYZAL) 5 MG tablet Take 1 tablet by       Cervical back: Normal range of motion and neck supple. No rigidity or tenderness.      Right lower leg: No edema.      Left lower leg: No edema.   Lymphadenopathy:      Cervical: No cervical adenopathy.   Skin:     General: Skin is warm and dry.      Coloration: Skin is not jaundiced or pale.      Findings: No bruising, erythema, lesion or rash.   Neurological:      General: No focal deficit present.      Mental Status: She is alert. Mental status is at baseline.      Cranial Nerves: No cranial nerve deficit.      Motor: No weakness.      Gait: Gait normal.   Psychiatric:         Mood and Affect: Mood normal.         Behavior: Behavior normal.         Thought Content: Thought content normal.         Judgment: Judgment normal.         Assessment & Plan:   Mrs Shaniqua López is a 44 y.o. female with history of obesity s/p gastric bypass, depression here today for CPE and followup of chronic conditions.      1. Wellness examination  Updated past medical, social and family history and ordered labs, imaging, immunizations and screenings appropriate for age, gender and comorbid conditions per current USPSTF guidelines.   -     Hemoglobin A1C; Future  -     CBC; Future  -     Comprehensive Metabolic Panel; Future  -     Lipid Panel; Future  -     Vitamin D 25 Hydroxy; Future  -     Vitamin B12; Future  -     Ferritin; Future  2. Class 3 severe obesity due to excess calories without serious comorbidity with body mass index (BMI) of 50.0 to 59.9 in adult (HCC): weight down 12lb over last year. Was unable to get GLP covered. Advised looking into weight loss clinic with Dr Walker or Priscila as would benefit from GLP  -     Hemoglobin A1C; Future  -     buPROPion (WELLBUTRIN XL) 300 MG extended release tablet; Take 1 tablet by mouth every morning, Disp-90 tablet, R-3Normal  3. S/P gastric bypass  Overview:  Vit D, B12, Iron supplements       Orders:  -     Vitamin D 25 Hydroxy; Future  -     Vitamin B12; Future  -

## 2024-08-29 NOTE — PROGRESS NOTES
Chief Complaint   Patient presents with    Annual Exam     Physical      \"Have you been to the ER, urgent care clinic since your last visit?  Hospitalized since your last visit?\"    YES - When: approximately 7/2024 ago.  Where and Why: URI.    “Have you seen or consulted any other health care providers outside of [facility] since your last visit?”    NO        “Have you had a colorectal cancer screening such as a colonoscopy/FIT/Cologuard?    NO    No colonoscopy on file  No cologuard on file  No FIT/FOBT on file   No flexible sigmoidoscopy on file         Click Here for Release of Records Request

## 2024-09-21 LAB
25(OH)D3+25(OH)D2 SERPL-MCNC: 70.6 NG/ML (ref 30–100)
ALBUMIN SERPL-MCNC: 3.8 G/DL (ref 3.9–4.9)
ALP SERPL-CCNC: 112 IU/L (ref 44–121)
ALT SERPL-CCNC: 21 IU/L (ref 0–32)
AST SERPL-CCNC: 17 IU/L (ref 0–40)
BILIRUB SERPL-MCNC: 0.5 MG/DL (ref 0–1.2)
BUN SERPL-MCNC: 9 MG/DL (ref 6–24)
BUN/CREAT SERPL: 16 (ref 9–23)
CALCIUM SERPL-MCNC: 9 MG/DL (ref 8.7–10.2)
CHLORIDE SERPL-SCNC: 102 MMOL/L (ref 96–106)
CHOLEST SERPL-MCNC: 177 MG/DL (ref 100–199)
CO2 SERPL-SCNC: 25 MMOL/L (ref 20–29)
CREAT SERPL-MCNC: 0.55 MG/DL (ref 0.57–1)
EGFRCR SERPLBLD CKD-EPI 2021: 115 ML/MIN/1.73
ERYTHROCYTE [DISTWIDTH] IN BLOOD BY AUTOMATED COUNT: 12.2 % (ref 11.7–15.4)
FERRITIN SERPL-MCNC: 105 NG/ML (ref 15–150)
GLOBULIN SER CALC-MCNC: 2.8 G/DL (ref 1.5–4.5)
GLUCOSE SERPL-MCNC: 80 MG/DL (ref 70–99)
HBA1C MFR BLD: 5.5 % (ref 4.8–5.6)
HCT VFR BLD AUTO: 45.5 % (ref 34–46.6)
HDLC SERPL-MCNC: 48 MG/DL
HGB BLD-MCNC: 14.8 G/DL (ref 11.1–15.9)
LDLC SERPL CALC-MCNC: 104 MG/DL (ref 0–99)
MCH RBC QN AUTO: 29.3 PG (ref 26.6–33)
MCHC RBC AUTO-ENTMCNC: 32.5 G/DL (ref 31.5–35.7)
MCV RBC AUTO: 90 FL (ref 79–97)
PLATELET # BLD AUTO: 134 X10E3/UL (ref 150–450)
POTASSIUM SERPL-SCNC: 4.6 MMOL/L (ref 3.5–5.2)
PROT SERPL-MCNC: 6.6 G/DL (ref 6–8.5)
RBC # BLD AUTO: 5.05 X10E6/UL (ref 3.77–5.28)
SODIUM SERPL-SCNC: 140 MMOL/L (ref 134–144)
TRIGL SERPL-MCNC: 143 MG/DL (ref 0–149)
VIT B12 SERPL-MCNC: 1025 PG/ML (ref 232–1245)
VLDLC SERPL CALC-MCNC: 25 MG/DL (ref 5–40)
VZV IGG SER IA-ACNC: 1628 INDEX
WBC # BLD AUTO: 9.1 X10E3/UL (ref 3.4–10.8)

## 2024-10-04 DIAGNOSIS — E55.9 VITAMIN D DEFICIENCY: ICD-10-CM

## 2024-10-04 DIAGNOSIS — Z98.84 S/P GASTRIC BYPASS: ICD-10-CM

## 2024-10-04 RX ORDER — FERROUS SULFATE 325(65) MG
1 TABLET ORAL
Qty: 90 TABLET | Refills: 3 | Status: SHIPPED | OUTPATIENT
Start: 2024-10-04

## 2025-09-02 DIAGNOSIS — B37.2 CANDIDAL INTERTRIGO: ICD-10-CM

## 2025-09-03 RX ORDER — NYSTATIN AND TRIAMCINOLONE ACETONIDE 100000; 1 [USP'U]/G; MG/G
OINTMENT TOPICAL
Qty: 60 G | Refills: 1 | OUTPATIENT
Start: 2025-09-03